# Patient Record
Sex: MALE | Race: WHITE | Employment: FULL TIME | URBAN - METROPOLITAN AREA
[De-identification: names, ages, dates, MRNs, and addresses within clinical notes are randomized per-mention and may not be internally consistent; named-entity substitution may affect disease eponyms.]

---

## 2021-11-09 ENCOUNTER — HOSPITAL ENCOUNTER (OUTPATIENT)
Dept: LAB | Age: 41
Discharge: HOME | End: 2021-11-09
Payer: OTHER GOVERNMENT

## 2021-11-09 ENCOUNTER — OFFICE VISIT (OUTPATIENT)
Dept: ONCOLOGY | Age: 41
End: 2021-11-09
Payer: OTHER GOVERNMENT

## 2021-11-09 VITALS
TEMPERATURE: 97.9 F | HEART RATE: 72 BPM | RESPIRATION RATE: 18 BRPM | SYSTOLIC BLOOD PRESSURE: 130 MMHG | DIASTOLIC BLOOD PRESSURE: 78 MMHG | HEIGHT: 71 IN | WEIGHT: 224 LBS | BODY MASS INDEX: 31.36 KG/M2 | OXYGEN SATURATION: 98 %

## 2021-11-09 DIAGNOSIS — D75.1 POLYCYTHEMIA: ICD-10-CM

## 2021-11-09 DIAGNOSIS — R79.89 ELEVATED FERRITIN: ICD-10-CM

## 2021-11-09 DIAGNOSIS — D69.6 THROMBOCYTOPENIA (HCC): Primary | ICD-10-CM

## 2021-11-09 PROBLEM — F32.A DEPRESSIVE DISORDER: Status: ACTIVE | Noted: 2019-11-12

## 2021-11-09 PROBLEM — R25.9 INVOLUNTARY MOVEMENTS: Status: ACTIVE | Noted: 2019-11-12

## 2021-11-09 PROBLEM — E83.119 HEMOCHROMATOSIS: Status: ACTIVE | Noted: 2019-11-12

## 2021-11-09 PROBLEM — R53.83 FATIGUE: Status: ACTIVE | Noted: 2019-11-12

## 2021-11-09 PROBLEM — R51.9 HEADACHE: Status: ACTIVE | Noted: 2020-02-06

## 2021-11-09 LAB
ALBUMIN SERPL-MCNC: 3.8 G/DL (ref 3.4–5)
ALBUMIN/GLOB SERPL: 1.3 {RATIO} (ref 0.8–1.7)
ALP SERPL-CCNC: 63 U/L (ref 45–117)
ALT SERPL-CCNC: 32 U/L (ref 16–61)
ANION GAP SERPL CALC-SCNC: 3 MMOL/L (ref 3–18)
AST SERPL-CCNC: 28 U/L (ref 10–38)
BASOPHILS # BLD: 0 K/UL (ref 0–0.1)
BASOPHILS NFR BLD: 0 % (ref 0–2)
BILIRUB SERPL-MCNC: 0.5 MG/DL (ref 0.2–1)
BUN SERPL-MCNC: 14 MG/DL (ref 7–18)
BUN/CREAT SERPL: 13 (ref 12–20)
CALCIUM SERPL-MCNC: 9.1 MG/DL (ref 8.5–10.1)
CHLORIDE SERPL-SCNC: 109 MMOL/L (ref 100–111)
CO2 SERPL-SCNC: 29 MMOL/L (ref 21–32)
CREAT SERPL-MCNC: 1.07 MG/DL (ref 0.6–1.3)
DIFFERENTIAL METHOD BLD: ABNORMAL
EOSINOPHIL # BLD: 0.2 K/UL (ref 0–0.4)
EOSINOPHIL NFR BLD: 2 % (ref 0–5)
ERYTHROCYTE [DISTWIDTH] IN BLOOD BY AUTOMATED COUNT: 12.3 % (ref 11.6–14.5)
FERRITIN SERPL-MCNC: 186 NG/ML (ref 8–388)
GLOBULIN SER CALC-MCNC: 3 G/DL (ref 2–4)
GLUCOSE SERPL-MCNC: 91 MG/DL (ref 74–99)
HCT VFR BLD AUTO: 47.3 % (ref 36–48)
HGB BLD-MCNC: 16.6 G/DL (ref 13–16)
IRON SATN MFR SERPL: 35 % (ref 20–50)
IRON SERPL-MCNC: 100 UG/DL (ref 50–175)
LYMPHOCYTES # BLD: 3 K/UL (ref 0.9–3.6)
LYMPHOCYTES NFR BLD: 39 % (ref 21–52)
MCH RBC QN AUTO: 30.7 PG (ref 24–34)
MCHC RBC AUTO-ENTMCNC: 35.1 G/DL (ref 31–37)
MCV RBC AUTO: 87.6 FL (ref 78–100)
MONOCYTES # BLD: 0.6 K/UL (ref 0.05–1.2)
MONOCYTES NFR BLD: 7 % (ref 3–10)
NEUTS SEG # BLD: 4 K/UL (ref 1.8–8)
NEUTS SEG NFR BLD: 51 % (ref 40–73)
PLATELET # BLD AUTO: 174 K/UL (ref 135–420)
PMV BLD AUTO: 11.3 FL (ref 9.2–11.8)
POTASSIUM SERPL-SCNC: 4.2 MMOL/L (ref 3.5–5.5)
PROT SERPL-MCNC: 6.8 G/DL (ref 6.4–8.2)
RBC # BLD AUTO: 5.4 M/UL (ref 4.35–5.65)
SODIUM SERPL-SCNC: 141 MMOL/L (ref 136–145)
TIBC SERPL-MCNC: 282 UG/DL (ref 250–450)
WBC # BLD AUTO: 7.8 K/UL (ref 4.6–13.2)

## 2021-11-09 PROCEDURE — 36415 COLL VENOUS BLD VENIPUNCTURE: CPT

## 2021-11-09 PROCEDURE — 82728 ASSAY OF FERRITIN: CPT

## 2021-11-09 PROCEDURE — 85025 COMPLETE CBC W/AUTO DIFF WBC: CPT

## 2021-11-09 PROCEDURE — 80053 COMPREHEN METABOLIC PANEL: CPT

## 2021-11-09 PROCEDURE — 99203 OFFICE O/P NEW LOW 30 MIN: CPT | Performed by: INTERNAL MEDICINE

## 2021-11-09 PROCEDURE — 83540 ASSAY OF IRON: CPT

## 2021-11-09 RX ORDER — HYDROCODONE BITARTRATE AND ACETAMINOPHEN 7.5; 325 MG/1; MG/1
TABLET ORAL
COMMUNITY

## 2021-11-09 RX ORDER — CHOLECALCIFEROL (VITAMIN D3) 25 MCG
TABLET,CHEWABLE ORAL
COMMUNITY

## 2021-11-09 RX ORDER — IBUPROFEN 800 MG/1
TABLET ORAL
COMMUNITY

## 2021-11-09 RX ORDER — CHOLECALCIFEROL (VITAMIN D3) 50 MCG
CAPSULE ORAL
COMMUNITY

## 2021-11-09 NOTE — PROGRESS NOTES
Hematology/Oncology Consultation Note      Date: 2021    Name: Sravanthi Floyd  : 1980        None         Subjective:     Chief complaint: Thrombocytopenia, Polycythemia, and elevated ferritin    History of Present Illness:   Mr. David Botello is a most pleasant 37y.o. year old male who was seen for consultation of Thrombocytopenia, Polycythemia, and elevated ferritin. The patient has a past medical history significant of Thrombocytopenia, Polycythemia, and elevated ferritin. He was seen by Dr. Kristen Eugene hematology/oncology [place], [place]) on 2018 in [place]. 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 Per chart review from his previous hematologist, 2018 CBC reviewed low platelet count of 914,913. His platelet was low since at least 2003, 138,000 per chart review. Previous work-up revealed hepatitis panel/HIV/antiplatelet QGDMGETS/J96/NHVEOC/LBEVPZM electrophoresis unremarkable, nrmal platelet clumping. He was suggested to continue follow-up with CBC every 6 months. He has also history of elevated ferritin level, homozygous H63D mutation positive, low likelihood of clinical hemochromatosis. He was placed on therapeutic phlebotomy every 3 to 4 months due to patient continuing systemic complaints with goal of maintaining ferritin in 100 -150 or less range. He has seen GI who has performed liver biopsy in 12/3/2019, hepatic iron index reported 1.1, mild iron accumulation, no fibrosis or inflammation reportedly. He has history of polycythemia likely secondary with normal EPO level, negative JAK2/CALR/MPL/JAK2 V617K mutations, possible underlying sleep apnea. The patient reported doing well overall. His last phlebotmy was about in May 2021  The patient otherwise has no other complaints. Denied fever, chills, night sweat, unintentional weight loss, skin lumps or bumps, acute bleeding or bruising issues.  Denied headache, acute vision change, dizziness, chest pain, worsen shortness of breath, palpitation, productive cough, nausea, vomiting, abdominal pain, altered bowel habits, dysuria, worsen bone pain or back pain, new focal numbness or weakness. Past Medical History, Family History, and Social History:    Past Medical History:   Diagnosis Date    Thrombocytopenia ([address]      Past Surgical History:   Procedure Laterality Date    HX ORTHOPAEDIC      left shoulder 2004/2005     Social History     Socioeconomic History    Marital status:      Spouse name: Not on file    Number of children: Not on file    Years of education: Not on file    Highest education level: Not on file   Occupational History    Not on file   Tobacco Use    Smoking status: Never Smoker    Smokeless tobacco: Never Used   Vaping Use    Vaping Use: Never used   Substance and Sexual Activity    Alcohol use: Yes    Drug use: Never    Sexual activity: Not on file   Other Topics Concern    Not on file   Social History Narrative    Not on file     Social Determinants of Health     Financial Resource Strain:     Difficulty of Paying Living Expenses: Not on file   Food Insecurity:     Worried About Running Out of Food in the Last Year: Not on file    Bibiana of Food in the Last Year: Not on file   Transportation Needs:     Lack of Transportation (Medical): Not on file    Lack of Transportation (Non-Medical):  Not on file   Physical Activity:     Days of Exercise per Week: Not on file    Minutes of Exercise per Session: Not on file   Stress:     Feeling of Stress : Not on file   Social Connections:     Frequency of Communication with Friends and Family: Not on file    Frequency of Social Gatherings with Friends and Family: Not on file    Attends Cheondoism Services: Not on file    Active Member of Clubs or Organizations: Not on file    Attends Club or Organization Meetings: Not on file    Marital Status: Not on file Intimate Partner Violence:     Fear of Current or Ex-Partner: Not on file    Emotionally Abused: Not on file    Physically Abused: Not on file    Sexually Abused: Not on file   Housing Stability:     Unable to Pay for Housing in the Last Year: Not on file    Number of [place] in the Last Year: Not on file    Unstable Housing in the Last Year: Not on file     History reviewed. No pertinent family history. Review of Systems   Constitutional: Negative for chills, diaphoresis, fever, malaise/fatigue and weight loss. Respiratory: Negative for cough, hemoptysis, shortness of breath and wheezing. Cardiovascular: Negative for chest pain, palpitations and leg swelling. Gastrointestinal: Negative for abdominal pain, diarrhea, heartburn, nausea and vomiting. Genitourinary: Negative for dysuria, frequency, hematuria and urgency. Musculoskeletal: Negative for joint pain and myalgias. Skin: Negative for itching and rash. Neurological: Negative for dizziness, seizures, weakness and headaches. Psychiatric/Behavioral: Negative for depression. The patient does not have insomnia. Objective:     Visit Vitals  /78   Pulse 72   Temp 97.9 °F (36.6 °C) (Temporal)   Resp 18   Ht 5' 11\" (1.803 m)   Wt 101.6 kg (224 lb)   SpO2 98%   BMI 31.24 kg/m²       ECOG Performance Status (grade): 0  0 - able to carry on all pre-disease activity w/out restriction  1 - restricted but able to carry out light work  2 - ambulatory and can self- care but unable to carry out work  3 - bed or chair >50% of waking hours  4 - completely disable, total care, confined to bed or chair    Physical Exam  Constitutional:       General: He is not in acute distress. HENT:      Head: Normocephalic and atraumatic. Eyes:      Pupils: Pupils are equal, round, and reactive to light. Cardiovascular:      Pulses: Normal pulses. Heart sounds: Normal heart sounds. No murmur heard.       Pulmonary:      Effort: Pulmonary effort is normal. No respiratory distress. Breath sounds: Normal breath sounds. Abdominal:      General: Bowel sounds are normal. There is no distension. Palpations: Abdomen is soft. There is no mass. Tenderness: There is no abdominal tenderness. There is no guarding. Musculoskeletal:         General: No swelling or tenderness. Cervical back: Neck supple. No rigidity. Lymphadenopathy:      Cervical: No cervical adenopathy. Skin:     General: Skin is warm. Findings: No rash. Neurological:      Mental Status: He is alert and oriented to person, place, and time. Mental status is at baseline. Cranial Nerves: No cranial nerve deficit. Psychiatric:         Mood and Affect: Mood normal.          Diagnostics:      No results found for this or any previous visit (from the past 96 hour(s)). Imaging:  No results found for this or any previous visit. No results found for this or any previous visit. No results found for this or any previous visit. Pathology          Assessment:                                        1. Thrombocytopenia ([address]    2. Polycythemia    3. Elevated ferritin        Plan:                                        # Thrombocytopenia  # Polycythemia  # Elevated ferritin. -- The patient has a past medical history significant of Thrombocytopenia, Polycythemia, and elevated ferritin. -- He was seen by Dr. Elva Zuñiga hematology/oncology [facility] on 8/20/2018 in [place]. 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 Per chart review from his previous hematologist, 7/2018 CBC reviewed low platelet count of 252,116. His platelet was low since at least 09/2003, 138,000 per chart review. Previous work-up revealed hepatitis panel/HIV/antiplatelet YUSHGIBN/Z70/ZOHQQH/BTRSXWX electrophoresis unremarkable, nrmal platelet clumping.   He was suggested to continue follow-up with CBC every 6 months. -- He has also history of elevated ferritin level, homozygous H63D mutation positive, low likelihood of clinical hemochromatosis. He was placed on therapeutic phlebotomy every 3 to 4 months due to patient continuing systemic complaints with goal of maintaining ferritin in 100 -150 or less range. He has seen GI who has performed liver biopsy in 12/3/2019, hepatic iron index reported 1.1, mild iron accumulation, no fibrosis or inflammation reportedly. -- He has history of polycythemia likely secondary with normal EPO level, negative JAK2/CALR/MPL/JAK2 V617K mutations, possible underlying sleep apnea. -- The patient reported doing well overall. His last phlebotmy was about in May 2021    Plan:  -- Lab check CBC, CMP, Iron profile, ferritin  -- He will probably need to resume therapeutic phlebotomy. -- Further workup will be guided by results from aforementioned initial study. -- We will see the patient back in about 2-3 weeks. Always sooner if required. Orders Placed This Encounter    METABOLIC PANEL, COMPREHENSIVE     Standing Status:   Future     Standing Expiration Date:   11/10/2022    IRON PROFILE     Standing Status:   Future     Standing Expiration Date:   11/10/2022    FERRITIN     Standing Status:   Future     Standing Expiration Date:   11/9/2022    CBC WITH AUTOMATED DIFF     Standing Status:   Future     Standing Expiration Date:   11/10/2022    omega 3-dha-epa-fish oil (Fish OiL) 100-160-1,000 mg cap     Sig: Fish Oil   Take 1 BID    ibuprofen (MOTRIN) 800 mg tablet     Sig: Motrin 800 mg tablet   Take 1 tablet 3 times a day by oral route.  HYDROcodone-acetaminophen (NORCO) 7.5-325 mg per tablet     Sig: hydrocodone 7.5 mg-acetaminophen 325 mg tablet   TK ONE T PO Q 4 HOURS PRN P    fexofenadine HCl (ALLEGRA PO)     Sig: Take  by mouth.     cyanocobalamin, vitamin B-12, 1,000 mcg cap     Sig: cyanocobalamin (vitamin B-12) 1,000 mcg capsule   Take 1 capsule every day by oral route. Mr. Bora Cedillo has a reminder for a \"due or due soon\" health maintenance. I have asked that he contact his primary care provider for follow-up on this health maintenance. All of patient's questions answered to their apparent satisfaction. They verbally show understanding and agreement with aforementioned plan. Angel Witt MD  11/9/2021        Above mentioned total time spent for this encounter with more than 50% of the time spent in face-to-face counseling, discussing on diagnosis and management plan going forward, and co-ordination of care. Parts of this document has been produced using Dragon dictation system. Unrecognized errors in transcription may be present. Please do not hesitate to reach out for any questions or clarifications.

## 2021-12-01 ENCOUNTER — VIRTUAL VISIT (OUTPATIENT)
Dept: ONCOLOGY | Age: 41
End: 2021-12-01
Payer: OTHER GOVERNMENT

## 2021-12-01 DIAGNOSIS — R79.89 ELEVATED FERRITIN: ICD-10-CM

## 2021-12-01 DIAGNOSIS — D75.1 POLYCYTHEMIA: Primary | ICD-10-CM

## 2021-12-01 PROCEDURE — 99442 PR PHYS/QHP TELEPHONE EVALUATION 11-20 MIN: CPT | Performed by: INTERNAL MEDICINE

## 2021-12-01 NOTE — PROGRESS NOTES
Ishaan Gamino is a 37 y.o. male, evaluated via audio-only technology on 12/1/2021 for No chief complaint on file. .    Assessment & Plan:   Diagnoses and all orders for this visit:    1. Polycythemia    2. Elevated ferritin      # Polycythemia  # Elevated ferritin; homozygous H63D mutation positive. -- The patient has a past medical history significant of Thrombocytopenia, Polycythemia, and elevated ferritin. -- He was seen by Dr. Aarti Hardwick hematology/oncology [facility] on 8/20/2018 in [place]. 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 Per chart review from his previous hematologist, 7/2018 CBC reviewed low platelet count of 667,725. His platelet was low since at least 09/2003, 138,000 per chart review. Previous work-up revealed hepatitis panel/HIV/antiplatelet WXFPSBDO/K12/QDUEFD/AHCCBYN electrophoresis unremarkable, nrmal platelet clumping. He was suggested to continue follow-up with CBC every 6 months. -- He has also history of elevated ferritin level, homozygous H63D mutation positive, low likelihood of clinical hemochromatosis. He was placed on therapeutic phlebotomy every 3 to 4 months due to patient continuing systemic complaints with goal of maintaining ferritin in range of 100 -150 or less . -- He has seen GI who has performed liver biopsy in 12/3/2019, hepatic iron index reported 1.1, mild iron accumulation, no fibrosis or inflammation reportedly. -- He has history of polycythemia likely secondary process with normal EPO level, negative JAK2/CALR/MPL/JAK2 V617K mutations, possible underlying sleep apnea. -- The patient reported doing well overall. His last phlebotmy was about in May 2021.  -- Today I have reviewed with the patient about recent lab reports. 11/9/2021 CBC reported hemoglobin 16.6, hematocrit 47.3%, WBC 7.8, platelet 868,956. Iron profile showed saturation 35%, ferritin 186.       Plan:  -- The patient was agreeable to resume therapeutic phlebotomy, every 3 months  -- Continue Lab check CBC, CMP, Iron profile, ferritin  -- He will f/u PCP for [facility] evaluation  -- We will see the patient back in about 6 months. Always sooner if required. Hx Thrombocytopenia  -- 11/9/2021 CBC reported hemoglobin 16.6, hematocrit 47.3%, WBC 7.8, platelet 574,442.    12  Subjective:   Mr. Yonathan Vazquez is a most pleasant 37y.o. year old male who was seen for consultation of Thrombocytopenia, Polycythemia, and elevated ferritin. The patient has a past medical history significant of Thrombocytopenia, Polycythemia, and elevated ferritin. He was seen by Dr. Angelita Goldberg hematology/oncology [facility] on 8/20/2018 in [place]. 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 Per chart review from his previous hematologist, 7/2018 CBC reviewed low platelet count of 008,325. His platelet was low since at least 09/2003, 138,000 per chart review. Previous work-up revealed hepatitis panel/HIV/antiplatelet DZBUXOYC/E22/JIQIBN/TBZMDVZ electrophoresis unremarkable, nrmal platelet clumping. He was suggested to continue follow-up with CBC every 6 months. He has also history of elevated ferritin level, homozygous H63D mutation positive, low likelihood of clinical hemochromatosis. He was placed on therapeutic phlebotomy every 3 to 4 months due to patient continuing systemic complaints with goal of maintaining ferritin in 100 -150 or less range. He has seen GI who has performed liver biopsy in 12/3/2019, hepatic iron index reported 1.1, mild iron accumulation, no fibrosis or inflammation reportedly. He has history of polycythemia likely secondary process with normal EPO level, negative JAK2/CALR/MPL/JAK2 V617K mutations, possible underlying sleep apnea. His last phlebotmy was about in May 2021  Since last visit the patient reported doing well overall.  The patient otherwise has no other complaints. Denied fever, chills, night sweat, unintentional weight loss, skin lumps or bumps, acute bleeding or bruising issues. Denied headache, acute vision change, dizziness, chest pain, worsen shortness of breath, palpitation, productive cough, nausea, vomiting, abdominal pain, altered bowel habits, dysuria, worsen bone pain or back pain, new focal numbness or weakness. Prior to Admission medications    Medication Sig Start Date End Date Taking? Authorizing Provider   omega 3-dha-epa-fish oil (Fish OiL) 100-160-1,000 mg cap Fish Oil   Take 1 BID    Provider, Historical   ibuprofen (MOTRIN) 800 mg tablet Motrin 800 mg tablet   Take 1 tablet 3 times a day by oral route. Provider, Historical   HYDROcodone-acetaminophen (NORCO) 7.5-325 mg per tablet hydrocodone 7.5 mg-acetaminophen 325 mg tablet   TK ONE T PO Q 4 HOURS PRN P  Patient not taking: Reported on 11/9/2021    Provider, Historical   fexofenadine HCl (ALLEGRA PO) Take  by mouth. Provider, Historical   cyanocobalamin, vitamin B-12, 1,000 mcg cap cyanocobalamin (vitamin B-12) 1,000 mcg capsule   Take 1 capsule every day by oral route. Provider, Historical     Patient Active Problem List   Diagnosis Code    Depressive disorder F32.9    Fatigue R53.83    Headache R51.9    Hemochromatosis E83.119    High serum ferritin R79.89    Involuntary movements R25.9       Review of Systems   Constitutional: Negative for chills, diaphoresis, fever, malaise/fatigue and weight loss. Respiratory: Negative for cough, hemoptysis, shortness of breath and wheezing. Cardiovascular: Negative for chest pain, palpitations and leg swelling. Gastrointestinal: Negative for abdominal pain, diarrhea, heartburn, nausea and vomiting. Genitourinary: Negative for dysuria, frequency, hematuria and urgency. Musculoskeletal: Negative for joint pain and myalgias. Skin: Negative for itching and rash.    Neurological: Negative for dizziness, seizures, weakness and headaches. Psychiatric/Behavioral: Negative for depression. The patient does not have insomnia. No flowsheet data found. Iman Copeland, who was evaluated through a patient-initiated, synchronous (real-time) audio only encounter, and/or her healthcare decision maker, is aware that it is a billable service, with coverage as determined by his insurance carrier. He provided verbal consent to proceed: Yes. He has not had a related appointment within my department in the past 7 days or scheduled within the next 24 hours. On this date 12/01/2021 I have spent 20 minutes reviewing previous notes, test results and face to face (virtual) with the patient discussing the diagnosis and importance of compliance with the treatment plan as well as documenting on the day of the visit.     Angela Harley MD

## 2022-01-05 ENCOUNTER — HOSPITAL ENCOUNTER (OUTPATIENT)
Dept: INFUSION THERAPY | Age: 42
End: 2022-01-05

## 2022-01-07 ENCOUNTER — HOSPITAL ENCOUNTER (OUTPATIENT)
Dept: INFUSION THERAPY | Age: 42
Discharge: HOME | End: 2022-01-07
Payer: OTHER GOVERNMENT

## 2022-01-07 VITALS
RESPIRATION RATE: 18 BRPM | SYSTOLIC BLOOD PRESSURE: 122 MMHG | HEART RATE: 67 BPM | DIASTOLIC BLOOD PRESSURE: 79 MMHG | OXYGEN SATURATION: 95 % | TEMPERATURE: 98 F

## 2022-01-07 LAB
BASO+EOS+MONOS # BLD AUTO: 0.5 K/UL (ref 0–2.3)
BASO+EOS+MONOS NFR BLD AUTO: 5 % (ref 0.1–17)
DIFFERENTIAL METHOD BLD: ABNORMAL
ERYTHROCYTE [DISTWIDTH] IN BLOOD BY AUTOMATED COUNT: 12.8 % (ref 11.5–14.5)
HCT VFR BLD AUTO: 48.3 % (ref 36–48)
HGB BLD-MCNC: 17.2 G/DL (ref 12–16)
LYMPHOCYTES # BLD: 3.3 K/UL (ref 1.1–5.9)
LYMPHOCYTES NFR BLD: 38 % (ref 14–44)
MCH RBC QN AUTO: 31.8 PG (ref 25–35)
MCHC RBC AUTO-ENTMCNC: 35.6 G/DL (ref 31–37)
MCV RBC AUTO: 89.3 FL (ref 78–102)
NEUTS SEG # BLD: 4.9 K/UL (ref 1.8–9.5)
NEUTS SEG NFR BLD: 57 % (ref 40–70)
PLATELET # BLD AUTO: 181 K/UL (ref 140–440)
RBC # BLD AUTO: 5.41 M/UL (ref 4.1–5.1)
WBC # BLD AUTO: 8.7 K/UL (ref 4.5–13)

## 2022-01-07 PROCEDURE — 99195 PHLEBOTOMY: CPT

## 2022-01-07 PROCEDURE — 85025 COMPLETE CBC W/AUTO DIFF WBC: CPT

## 2022-01-07 PROCEDURE — 36415 COLL VENOUS BLD VENIPUNCTURE: CPT

## 2022-01-07 PROCEDURE — 74011250636 HC RX REV CODE- 250/636: Performed by: INTERNAL MEDICINE

## 2022-01-07 RX ORDER — SODIUM CHLORIDE 9 MG/ML
999 INJECTION, SOLUTION INTRAVENOUS CONTINUOUS
Status: DISCONTINUED | OUTPATIENT
Start: 2022-01-07 | End: 2022-01-08 | Stop reason: HOSPADM

## 2022-01-07 RX ADMIN — SODIUM CHLORIDE 999 ML/HR: 9 INJECTION, SOLUTION INTRAVENOUS at 15:15

## 2022-01-07 NOTE — PROGRESS NOTES
[facility] Progress Note                   Date: 2022    Name: Gonzalo Reynoso    MRN: [id number]    : 1980    Therapeutic Phlebotomy (Weekly Status)    Mr. Reji Mcnally to [facility] ambulatory at [phone] accompanied by self. Pt was accessed and education was provided. Pt states he just moved here from [place] and has been getting therapeutic phlebotomies since 2019 and has tolerated well. Pt politely declined education packet as he has already been getting them and is well educated on procedure. Mr. Shellie Moody vitals were reviewed and patient was observed for 5 minutes prior to treatment. Visit Vitals  /79 (BP 1 Location: Left upper arm, BP Patient Position: Sitting)   Pulse 67   Temp 98 °F (36.7 °C)   Resp 18   SpO2 95%     CBC drawn from [facility] by Monique Phlebotomist.     Recent Results (from the past 24 hour(s))   CBC WITH 3 PART DIFF    Collection Time: 22  2:20 PM   Result Value Ref Range    WBC 8.7 4.5 - 13.0 K/uL    RBC 5.41 (H) 4.10 - 5.10 M/uL    HGB 17.2 (HH) 12.0 - 16 g/dL    HCT 48.3 (H) 36 - 48 %    MCV 89.3 78 - 102 FL    MCH 31.8 25.0 - 35.0 PG    MCHC 35.6 31 - 37 g/dL    RDW 12.8 11.5 - 14.5 %    PLATELET 282 672 - 819 K/uL    NEUTROPHILS 57 40 - 70 %    MIXED CELLS 5 0.1 - 17 %    LYMPHOCYTES 38 14 - 44 %    ABS. NEUTROPHILS 4.9 1.8 - 9.5 K/UL    ABS. MIXED CELLS 0.5 0.0 - 2.3 K/uL    ABS. LYMPHOCYTES 3.3 1.1 - 5.9 K/UL    DF AUTOMATED           20GA IV inserted in patient's Right antecubital  x1 attempt. Phlebotomy started at 1458. Phlebotomy ended at 1515. NS 500cc bolus started at end of phlebotomy. Pt remained stable throughout procedure and voices no complaints. Pt tolerated well with no complaints or concerns. Patients armband removed and shredded. Mr. Reji Mcnally was discharged from [place] [address] in stable condition at 1540.  He is to return on 22 at 1400 for his next phlebotomy (weekly status) appointment.     Nereyda Cervantes RN  January 7, 2022  4:09 PM

## 2022-01-07 NOTE — PROGRESS NOTES
[facility] Progress Note                   Date: 2022    Name: Jannette Farrar    MRN: [id number]    : 1980    Therapeutic Phlebotomy (Weekly Status)    Mr. Randolph Werner to [facility] ambulatory at [phone] accompanied by self. Pt was accessed and education was provided. Pt states he just moved here from [place] and has been getting therapeutic phlebotomies since 2019 and has tolerated well. Mr. Henry Iglesias vitals were reviewed and patient was observed for 5 minutes prior to treatment. Visit Vitals  /79 (BP 1 Location: Left upper arm, BP Patient Position: Sitting)   Pulse 67   Temp 98 °F (36.7 °C)   Resp 18   SpO2 95%     CBC drawn from [facility] by Monique Phlebotomist.     Recent Results (from the past 24 hour(s))   CBC WITH 3 PART DIFF    Collection Time: 22  2:20 PM   Result Value Ref Range    WBC 8.7 4.5 - 13.0 K/uL    RBC 5.41 (H) 4.10 - 5.10 M/uL    HGB 17.2 (HH) 12.0 - 16 g/dL    HCT 48.3 (H) 36 - 48 %    MCV 89.3 78 - 102 FL    MCH 31.8 25.0 - 35.0 PG    MCHC 35.6 31 - 37 g/dL    RDW 12.8 11.5 - 14.5 %    PLATELET 297 746 - 038 K/uL    NEUTROPHILS 57 40 - 70 %    MIXED CELLS 5 0.1 - 17 %    LYMPHOCYTES 38 14 - 44 %    ABS. NEUTROPHILS 4.9 1.8 - 9.5 K/UL    ABS. MIXED CELLS 0.5 0.0 - 2.3 K/uL    ABS. LYMPHOCYTES 3.3 1.1 - 5.9 K/UL    DF AUTOMATED           20GA IV inserted in patient's Right antecubital  x1 attempt. Phlebotomy started at 1458. Phlebotomy ended at 1515. NS 500cc bolus started at this time. Pt remained stable throughout procedure and voices no complaints. Pt tolerated well with no complaints or concerns. Patients armband removed and shredded. Mr. Randolph Werner was discharged from [place] [address] in stable condition at 1540. He is to return on 22 at 1400 for his next phlebotomy (weekly status) appointment.     Yas Sahni RN  2022  4:09 PM

## 2022-01-14 ENCOUNTER — APPOINTMENT (OUTPATIENT)
Dept: INFUSION THERAPY | Age: 42
End: 2022-01-14
Payer: OTHER GOVERNMENT

## 2022-01-14 NOTE — ADDENDUM NOTE
Encounter addended by: Lizet Merchant RN on: 1/14/2022 5:29 PM   Actions taken: Charge Capture section accepted

## 2022-01-18 ENCOUNTER — HOSPITAL ENCOUNTER (OUTPATIENT)
Dept: INFUSION THERAPY | Age: 42
Discharge: HOME | End: 2022-01-18
Payer: OTHER GOVERNMENT

## 2022-01-18 VITALS
DIASTOLIC BLOOD PRESSURE: 73 MMHG | OXYGEN SATURATION: 98 % | TEMPERATURE: 97.9 F | HEART RATE: 76 BPM | SYSTOLIC BLOOD PRESSURE: 145 MMHG | RESPIRATION RATE: 18 BRPM

## 2022-01-18 LAB
BASO+EOS+MONOS # BLD AUTO: 0.4 K/UL (ref 0–2.3)
BASO+EOS+MONOS NFR BLD AUTO: 6 % (ref 0.1–17)
DIFFERENTIAL METHOD BLD: NORMAL
ERYTHROCYTE [DISTWIDTH] IN BLOOD BY AUTOMATED COUNT: 13.1 % (ref 11.5–14.5)
HCT VFR BLD AUTO: 44.8 % (ref 36–48)
HGB BLD-MCNC: 15.7 G/DL (ref 12–16)
LYMPHOCYTES # BLD: 2.8 K/UL (ref 1.1–5.9)
LYMPHOCYTES NFR BLD: 37 % (ref 14–44)
MCH RBC QN AUTO: 31.2 PG (ref 25–35)
MCHC RBC AUTO-ENTMCNC: 35 G/DL (ref 31–37)
MCV RBC AUTO: 88.9 FL (ref 78–102)
NEUTS SEG # BLD: 4.3 K/UL (ref 1.8–9.5)
NEUTS SEG NFR BLD: 57 % (ref 40–70)
PLATELET # BLD AUTO: 156 K/UL (ref 140–440)
RBC # BLD AUTO: 5.04 M/UL (ref 4.1–5.1)
WBC # BLD AUTO: 7.5 K/UL (ref 4.5–13)

## 2022-01-18 PROCEDURE — 99195 PHLEBOTOMY: CPT

## 2022-01-18 PROCEDURE — 85025 COMPLETE CBC W/AUTO DIFF WBC: CPT

## 2022-01-18 RX ORDER — MELOXICAM 15 MG/1
15 TABLET ORAL DAILY
COMMUNITY

## 2022-01-25 ENCOUNTER — HOSPITAL ENCOUNTER (OUTPATIENT)
Dept: INFUSION THERAPY | Age: 42
Discharge: HOME | End: 2022-01-25
Payer: OTHER GOVERNMENT

## 2022-01-25 VITALS
OXYGEN SATURATION: 100 % | SYSTOLIC BLOOD PRESSURE: 123 MMHG | TEMPERATURE: 97.7 F | RESPIRATION RATE: 16 BRPM | HEART RATE: 69 BPM | DIASTOLIC BLOOD PRESSURE: 81 MMHG

## 2022-01-25 LAB
BASO+EOS+MONOS # BLD AUTO: 0.3 K/UL (ref 0–2.3)
BASO+EOS+MONOS NFR BLD AUTO: 4 % (ref 0.1–17)
DIFFERENTIAL METHOD BLD: NORMAL
ERYTHROCYTE [DISTWIDTH] IN BLOOD BY AUTOMATED COUNT: 13.1 % (ref 11.5–14.5)
HCT VFR BLD AUTO: 44.3 % (ref 36–48)
HGB BLD-MCNC: 15.2 G/DL (ref 12–16)
LYMPHOCYTES # BLD: 2.5 K/UL (ref 1.1–5.9)
LYMPHOCYTES NFR BLD: 38 % (ref 14–44)
MCH RBC QN AUTO: 30.8 PG (ref 25–35)
MCHC RBC AUTO-ENTMCNC: 34.3 G/DL (ref 31–37)
MCV RBC AUTO: 89.9 FL (ref 78–102)
NEUTS SEG # BLD: 3.8 K/UL (ref 1.8–9.5)
NEUTS SEG NFR BLD: 58 % (ref 40–70)
PLATELET # BLD AUTO: 158 K/UL (ref 140–440)
RBC # BLD AUTO: 4.93 M/UL (ref 4.1–5.1)
WBC # BLD AUTO: 6.6 K/UL (ref 4.5–13)

## 2022-01-25 PROCEDURE — 36415 COLL VENOUS BLD VENIPUNCTURE: CPT

## 2022-01-25 PROCEDURE — 99195 PHLEBOTOMY: CPT

## 2022-01-25 PROCEDURE — 74011250636 HC RX REV CODE- 250/636: Performed by: INTERNAL MEDICINE

## 2022-01-25 PROCEDURE — 85025 COMPLETE CBC W/AUTO DIFF WBC: CPT

## 2022-01-25 RX ORDER — SODIUM CHLORIDE 9 MG/ML
500 INJECTION, SOLUTION INTRAVENOUS CONTINUOUS
Status: DISCONTINUED | OUTPATIENT
Start: 2022-01-25 | End: 2022-01-26 | Stop reason: HOSPADM

## 2022-01-25 RX ADMIN — SODIUM CHLORIDE 500 ML: 9 INJECTION, SOLUTION INTRAVENOUS at 14:55

## 2022-01-25 NOTE — PROGRESS NOTES
[facility] Progress Note                   Date: 2022    Name: Glenys Sims    MRN: [id number]    : 1980    Therapeutic Phlebotomy (Weekly Status)    Mr. Elizabeth Roe to [address] [place] [address] ambulatory at [phone]. Pt was accessed and education, including discharge instructions, was provided. Mr. Monroe Poole vitals were reviewed and patient was observed for 5 minutes prior to treatment. Visit Vitals  /79 (BP 1 Location: Right upper arm, BP Patient Position: Sitting)   Pulse (!) 57   Temp 97.5 °F (36.4 °C)   Resp 16   SpO2 100%     CBC drawn from PIV site started with 20G x1 attempt. Recent Results (from the past 24 hour(s))   CBC WITH 3 PART DIFF    Collection Time: 22  2:20 PM   Result Value Ref Range    WBC 6.6 4.5 - 13.0 K/uL    RBC 4.93 4.10 - 5.10 M/uL    HGB 15.2 12.0 - 16 g/dL    HCT 44.3 36 - 48 %    MCV 89.9 78 - 102 FL    MCH 30.8 25.0 - 35.0 PG    MCHC 34.3 31 - 37 g/dL    RDW 13.1 11.5 - 14.5 %    PLATELET 973 245 - 294 K/uL    NEUTROPHILS 58 40 - 70 %    MIXED CELLS 4 0.1 - 17 %    LYMPHOCYTES 38 14 - 44 %    ABS. NEUTROPHILS 3.8 1.8 - 9.5 K/UL    ABS. MIXED CELLS 0.3 0.0 - 2.3 K/uL    ABS. LYMPHOCYTES 2.5 1.1 - 5.9 K/UL    DF AUTOMATED       HCT = 44.3    Phlebotomy started at 1431 and stopped at 1455. Approx 500 ml blood removed from patient and 500 ml NS IV given. Pt tolerated well with no complaints or concerns. Patients armband removed and shredded. Mr. Elizabeth Roe was discharged from [place] [address] in stable condition at [phone]. He is to return in one weekfor his next phlebotomy (weekly status) appointment.       Marlow Meckel, RN,RN  2022

## 2022-02-01 ENCOUNTER — APPOINTMENT (OUTPATIENT)
Dept: INFUSION THERAPY | Age: 42
End: 2022-02-01
Payer: OTHER GOVERNMENT

## 2022-02-16 ENCOUNTER — HOSPITAL ENCOUNTER (OUTPATIENT)
Dept: INFUSION THERAPY | Age: 42
Discharge: HOME | End: 2022-02-16
Payer: OTHER GOVERNMENT

## 2022-02-16 VITALS
HEART RATE: 75 BPM | DIASTOLIC BLOOD PRESSURE: 75 MMHG | RESPIRATION RATE: 16 BRPM | TEMPERATURE: 98.3 F | SYSTOLIC BLOOD PRESSURE: 132 MMHG | OXYGEN SATURATION: 98 %

## 2022-02-16 LAB
BASO+EOS+MONOS # BLD AUTO: 0.4 K/UL (ref 0–2.3)
BASO+EOS+MONOS NFR BLD AUTO: 8 % (ref 0.1–17)
DIFFERENTIAL METHOD BLD: ABNORMAL
ERYTHROCYTE [DISTWIDTH] IN BLOOD BY AUTOMATED COUNT: 13.2 % (ref 11.5–14.5)
HCT VFR BLD AUTO: 44.8 % (ref 36–48)
HGB BLD-MCNC: 15 G/DL (ref 12–16)
LYMPHOCYTES # BLD: 1.9 K/UL (ref 1.1–5.9)
LYMPHOCYTES NFR BLD: 40 % (ref 14–44)
MCH RBC QN AUTO: 30.7 PG (ref 25–35)
MCHC RBC AUTO-ENTMCNC: 33.5 G/DL (ref 31–37)
MCV RBC AUTO: 91.6 FL (ref 78–102)
NEUTS SEG # BLD: 2.5 K/UL (ref 1.8–9.5)
NEUTS SEG NFR BLD: 53 % (ref 40–70)
PLATELET # BLD AUTO: 132 K/UL (ref 140–440)
RBC # BLD AUTO: 4.89 M/UL (ref 4.1–5.1)
WBC # BLD AUTO: 4.8 K/UL (ref 4.5–13)

## 2022-02-16 PROCEDURE — 85025 COMPLETE CBC W/AUTO DIFF WBC: CPT

## 2022-02-16 PROCEDURE — 36415 COLL VENOUS BLD VENIPUNCTURE: CPT

## 2022-02-16 PROCEDURE — 99195 PHLEBOTOMY: CPT

## 2022-02-16 NOTE — PROGRESS NOTES
[facility] Progress Note                   Date: 2022    Name: Zena Jama    MRN: [id number]    : 1980    Therapeutic Phlebotomy (Weekly Status)    Mr. Migdalia Roper to [facility] ambulatory at 1300 accompanied by self. Pt was accessed and education was provided. Mr. Robert Tolentino vitals were reviewed. Visit Vitals  /71 (BP 1 Location: Left upper arm, BP Patient Position: Sitting)   Pulse 72   Temp 98 °F (36.7 °C)   Resp 16   SpO2 98%     CBC drawn from [facility] by Monique Phlebotomist. Labs indicate phlebotomy needed for today. Recent Results (from the past 24 hour(s))   CBC WITH 3 PART DIFF    Collection Time: 22  1:02 PM   Result Value Ref Range    WBC 4.8 4.5 - 13.0 K/uL    RBC 4.89 4. 10 - 5.10 M/uL    HGB 15.0 12.0 - 16 g/dL    HCT 44.8 36 - 48 %    MCV 91.6 78 - 102 FL    MCH 30.7 25.0 - 35.0 PG    MCHC 33.5 31 - 37 g/dL    RDW 13.2 11.5 - 14.5 %    PLATELET 939 (L) 906 - 440 K/uL    NEUTROPHILS 53 40 - 70 %    MIXED CELLS 8 0.1 - 17 %    LYMPHOCYTES 40 14 - 44 %    ABS. NEUTROPHILS 2.5 1.8 - 9.5 K/UL    ABS. MIXED CELLS 0.4 0.0 - 2.3 K/uL    ABS. LYMPHOCYTES 1.9 1.1 - 5.9 K/UL    DF AUTOMATED         20GA IV inserted in patient's left antecubital  x1 attempt. Phlebotomy started at 1325. Initial PIV stopped draining, attempted to flush with no success. 2nd PIV #20G started in [facility] without difficulty. Phlebotomy ended at 1408. Pt remained stable throughout procedure and voices no complaints. Pt tolerated well with no complaints or concerns. Patients armband removed and shredded. Mr. Migdalia Roper was discharged from [place] [id number] in stable condition at 1410. He is to schedule his next phlebotomy (weekly status) appointment.     Andrew Corrigan RN  2022

## 2022-02-23 ENCOUNTER — HOSPITAL ENCOUNTER (OUTPATIENT)
Dept: INFUSION THERAPY | Age: 42
Discharge: HOME | End: 2022-02-23
Payer: OTHER GOVERNMENT

## 2022-02-23 VITALS
TEMPERATURE: 98.2 F | HEART RATE: 66 BPM | DIASTOLIC BLOOD PRESSURE: 76 MMHG | OXYGEN SATURATION: 98 % | SYSTOLIC BLOOD PRESSURE: 124 MMHG | RESPIRATION RATE: 16 BRPM

## 2022-02-23 LAB
BASO+EOS+MONOS # BLD AUTO: 0.5 K/UL (ref 0–2.3)
BASO+EOS+MONOS NFR BLD AUTO: 8 % (ref 0.1–17)
DIFFERENTIAL METHOD BLD: ABNORMAL
ERYTHROCYTE [DISTWIDTH] IN BLOOD BY AUTOMATED COUNT: 12.9 % (ref 11.5–14.5)
HCT VFR BLD AUTO: 42.1 % (ref 36–48)
HGB BLD-MCNC: 14.4 G/DL (ref 12–16)
LYMPHOCYTES # BLD: 2.6 K/UL (ref 1.1–5.9)
LYMPHOCYTES NFR BLD: 42 % (ref 14–44)
MCH RBC QN AUTO: 31 PG (ref 25–35)
MCHC RBC AUTO-ENTMCNC: 34.2 G/DL (ref 31–37)
MCV RBC AUTO: 90.7 FL (ref 78–102)
NEUTS SEG # BLD: 3 K/UL (ref 1.8–9.5)
NEUTS SEG NFR BLD: 50 % (ref 40–70)
PLATELET # BLD AUTO: 138 K/UL (ref 140–440)
RBC # BLD AUTO: 4.64 M/UL (ref 4.1–5.1)
WBC # BLD AUTO: 6.1 K/UL (ref 4.5–13)

## 2022-02-23 PROCEDURE — 99195 PHLEBOTOMY: CPT

## 2022-02-23 PROCEDURE — 36415 COLL VENOUS BLD VENIPUNCTURE: CPT

## 2022-02-23 PROCEDURE — 74011250636 HC RX REV CODE- 250/636: Performed by: INTERNAL MEDICINE

## 2022-02-23 PROCEDURE — 85025 COMPLETE CBC W/AUTO DIFF WBC: CPT

## 2022-02-23 RX ORDER — SODIUM CHLORIDE 9 MG/ML
150 INJECTION, SOLUTION INTRAVENOUS CONTINUOUS
Status: DISCONTINUED | OUTPATIENT
Start: 2022-02-23 | End: 2022-02-24 | Stop reason: HOSPADM

## 2022-02-23 RX ADMIN — SODIUM CHLORIDE 150 ML/HR: 9 INJECTION, SOLUTION INTRAVENOUS at 14:00

## 2022-02-23 NOTE — PROGRESS NOTES
[facility] Progress Note                   Date: 2022    Name: Basilio Logan    MRN: [id number]    : 1980    Therapeutic Phlebotomy (Weekly Status)    Mr. Ana Burden to [facility] ambulatory at 1210 accompanied by self. Pt was accessed and education was provided. Mr. Carlos Del Valle vitals were reviewed and patient was observed for 5 minutes prior to treatment. Visit Vitals  /78 (BP 1 Location: Left upper arm, BP Patient Position: Sitting)   Pulse (!) 59   Temp 98.8 °F (37.1 °C)   Resp 16   SpO2 96%     CBC drawn from PIV site started with 20G x1 attempt in right AC. Recent Results (from the past 24 hour(s))   CBC WITH 3 PART DIFF    Collection Time: 22 12:20 PM   Result Value Ref Range    WBC 6.1 4.5 - 13.0 K/uL    RBC 4.64 4.10 - 5.10 M/uL    HGB 14.4 12.0 - 16 g/dL    HCT 42.1 36 - 48 %    MCV 90.7 78 - 102 FL    MCH 31.0 25.0 - 35.0 PG    MCHC 34.2 31 - 37 g/dL    RDW 12.9 11.5 - 14.5 %    PLATELET 121 (L) 624 - 440 K/uL    NEUTROPHILS 50 40 - 70 %    Mixed cells 8 0.1 - 17 %    LYMPHOCYTES 42 14 - 44 %    ABS. NEUTROPHILS 3.0 1.8 - 9.5 K/UL    ABS. MIXED CELLS 0.5 0.0 - 2.3 K/uL    ABS. LYMPHOCYTES 2.6 1.1 - 5.9 K/UL    DF AUTOMATED         Phlebotomy started at 1235. IV stopped draining, flushed and able to get flow going again but very slow. Started another IV in right hand without difficulty (20G). D/C'd previous IV, flushed and covered with gauze and coban. Tolerated procedure well. Restarted phlebotomy. Phlebotomy ended at 1400. Approx 500cc removed. NS 500cc bolus started at end of phlebotomy. Pt remained stable throughout procedure and voices no complaints. Pt tolerated well with no complaints or concerns. Patients armband removed and shredded. Mr. Ana Burden was discharged from [address] in stable condition at 1500.  He is to call and schedule his appointment for his next phlebotomy (weekly status) appointment.       Isidra Key RN  February 23, 2022

## 2022-03-09 ENCOUNTER — APPOINTMENT (OUTPATIENT)
Dept: INFUSION THERAPY | Age: 42
End: 2022-03-09

## 2022-03-18 PROBLEM — F32.A DEPRESSIVE DISORDER: Status: ACTIVE | Noted: 2019-11-12

## 2022-03-19 PROBLEM — R51.9 HEADACHE: Status: ACTIVE | Noted: 2020-02-06

## 2022-03-19 PROBLEM — E83.119 HEMOCHROMATOSIS: Status: ACTIVE | Noted: 2019-11-12

## 2022-03-20 PROBLEM — R25.9 INVOLUNTARY MOVEMENTS: Status: ACTIVE | Noted: 2019-11-12

## 2022-03-20 PROBLEM — R79.89 HIGH SERUM FERRITIN: Status: ACTIVE | Noted: 2019-11-12

## 2022-03-20 PROBLEM — R53.83 FATIGUE: Status: ACTIVE | Noted: 2019-11-12

## 2022-06-06 ENCOUNTER — LAB ONLY (OUTPATIENT)
Dept: ONCOLOGY | Age: 42
End: 2022-06-06

## 2022-06-06 ENCOUNTER — HOSPITAL ENCOUNTER (OUTPATIENT)
Dept: LAB | Age: 42
Discharge: HOME | End: 2022-06-06
Payer: OTHER GOVERNMENT

## 2022-06-06 DIAGNOSIS — D75.1 POLYCYTHEMIA: Primary | ICD-10-CM

## 2022-06-06 DIAGNOSIS — R79.89 ELEVATED FERRITIN: ICD-10-CM

## 2022-06-06 DIAGNOSIS — D75.1 POLYCYTHEMIA: ICD-10-CM

## 2022-06-06 LAB
ALBUMIN SERPL-MCNC: 3.9 G/DL (ref 3.4–5)
ALBUMIN/GLOB SERPL: 1.3 {RATIO} (ref 0.8–1.7)
ALP SERPL-CCNC: 55 U/L (ref 45–117)
ALT SERPL-CCNC: 39 U/L (ref 16–61)
ANION GAP SERPL CALC-SCNC: 4 MMOL/L (ref 3–18)
AST SERPL-CCNC: 27 U/L (ref 10–38)
BASOPHILS # BLD: 0 K/UL (ref 0–0.1)
BASOPHILS NFR BLD: 0 % (ref 0–2)
BILIRUB SERPL-MCNC: 0.6 MG/DL (ref 0.2–1)
BUN SERPL-MCNC: 14 MG/DL (ref 7–18)
BUN/CREAT SERPL: 13 (ref 12–20)
CALCIUM SERPL-MCNC: 9.1 MG/DL (ref 8.5–10.1)
CHLORIDE SERPL-SCNC: 107 MMOL/L (ref 100–111)
CO2 SERPL-SCNC: 30 MMOL/L (ref 21–32)
CREAT SERPL-MCNC: 1.07 MG/DL (ref 0.6–1.3)
DIFFERENTIAL METHOD BLD: ABNORMAL
EOSINOPHIL # BLD: 0.2 K/UL (ref 0–0.4)
EOSINOPHIL NFR BLD: 3 % (ref 0–5)
ERYTHROCYTE [DISTWIDTH] IN BLOOD BY AUTOMATED COUNT: 14.3 % (ref 11.6–14.5)
FERRITIN SERPL-MCNC: 38 NG/ML (ref 8–388)
GLOBULIN SER CALC-MCNC: 3 G/DL (ref 2–4)
GLUCOSE SERPL-MCNC: 104 MG/DL (ref 74–99)
HCT VFR BLD AUTO: 48.5 % (ref 36–48)
HGB BLD-MCNC: 16.4 G/DL (ref 13–16)
IMM GRANULOCYTES # BLD AUTO: 0 K/UL (ref 0–0.04)
IMM GRANULOCYTES NFR BLD AUTO: 0 % (ref 0–0.5)
IRON SATN MFR SERPL: 47 % (ref 20–50)
IRON SERPL-MCNC: 145 UG/DL (ref 50–175)
LYMPHOCYTES # BLD: 2.7 K/UL (ref 0.9–3.6)
LYMPHOCYTES NFR BLD: 38 % (ref 21–52)
MCH RBC QN AUTO: 29 PG (ref 24–34)
MCHC RBC AUTO-ENTMCNC: 33.8 G/DL (ref 31–37)
MCV RBC AUTO: 85.7 FL (ref 78–100)
MONOCYTES # BLD: 0.5 K/UL (ref 0.05–1.2)
MONOCYTES NFR BLD: 7 % (ref 3–10)
NEUTS SEG # BLD: 3.7 K/UL (ref 1.8–8)
NEUTS SEG NFR BLD: 52 % (ref 40–73)
NRBC # BLD: 0 K/UL (ref 0–0.01)
NRBC BLD-RTO: 0 PER 100 WBC
PLATELET # BLD AUTO: 156 K/UL (ref 135–420)
PMV BLD AUTO: 12.1 FL (ref 9.2–11.8)
POTASSIUM SERPL-SCNC: 4.4 MMOL/L (ref 3.5–5.5)
PROT SERPL-MCNC: 6.9 G/DL (ref 6.4–8.2)
RBC # BLD AUTO: 5.66 M/UL (ref 4.35–5.65)
SODIUM SERPL-SCNC: 141 MMOL/L (ref 136–145)
TIBC SERPL-MCNC: 310 UG/DL (ref 250–450)
WBC # BLD AUTO: 7.2 K/UL (ref 4.6–13.2)

## 2022-06-06 PROCEDURE — 36415 COLL VENOUS BLD VENIPUNCTURE: CPT

## 2022-06-06 PROCEDURE — 83540 ASSAY OF IRON: CPT

## 2022-06-06 PROCEDURE — 82728 ASSAY OF FERRITIN: CPT

## 2022-06-06 PROCEDURE — 80053 COMPREHEN METABOLIC PANEL: CPT

## 2022-06-06 PROCEDURE — 85025 COMPLETE CBC W/AUTO DIFF WBC: CPT

## 2022-06-22 ENCOUNTER — VIRTUAL VISIT (OUTPATIENT)
Dept: ONCOLOGY | Age: 42
End: 2022-06-22
Payer: OTHER GOVERNMENT

## 2022-06-22 DIAGNOSIS — R79.89 ELEVATED FERRITIN: ICD-10-CM

## 2022-06-22 DIAGNOSIS — D75.1 POLYCYTHEMIA: ICD-10-CM

## 2022-06-22 DIAGNOSIS — D75.1 POLYCYTHEMIA: Primary | ICD-10-CM

## 2022-06-22 PROCEDURE — 99443 PR PHYS/QHP TELEPHONE EVALUATION 21-30 MIN: CPT | Performed by: INTERNAL MEDICINE

## 2022-06-22 NOTE — PROGRESS NOTES
Es Nava is a 40 y.o. male, evaluated via audio-only technology on 6/22/2022 for Follow-up  . Assessment & Plan:   Diagnoses and all orders for this visit:    1. Polycythemia    2. Elevated ferritin      The complexity of medical decision making for this visit is moderate. # Polycythemia  # Elevated ferritin; homozygous H63D mutation positive. -- The patient has a past medical history significant of Thrombocytopenia, Polycythemia, and elevated ferritin. -- He was seen by Dr. Jonathan Bojorquez hematology/oncology [facility] on 8/20/2018 in [place]. 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 Per chart review from his previous hematologist, 7/2018 CBC reviewed low platelet count of 042,895. His platelet was low since at least 09/2003, 138,000 per chart review. Previous work-up revealed hepatitis panel/HIV/antiplatelet EZLCEDVL/F86/YJGOLV/YPPNPUR electrophoresis unremarkable, nrmal platelet clumping. He was suggested to continue follow-up with CBC every 6 months. -- He has also history of elevated ferritin level, homozygous H63D mutation positive, low likelihood of clinical hemochromatosis. He was placed on therapeutic phlebotomy every 3 to 4 months due to patient continuing systemic complaints with goal of maintaining ferritin in range of 100 -150 or less . -- He has seen GI who has performed liver biopsy in 12/3/2019, hepatic iron index reported 1.1, mild iron accumulation, no fibrosis or inflammation reportedly. -- Further workup revealed normal EPO level, negative JAK2/CALR/MPL/JAK2 V617K mutations. The patient doesn't think he has underlying sleep apnea. -- The patient reported doing well overall. His last phlebotmy was about in May 2021.  -- 11/9/2021 CBC reported hemoglobin 16.6, hematocrit 47.3%, WBC 7.8, platelet 818,229.   Iron profile showed saturation 35%, ferritin 186.  -- Today I have reviewed with the patient about recent lab reports. 6/6/2022 H/H 16.4/48.5, WBC 7.2, PLTs 156K    Plan:  -- His persistent polycythemia was still unclear etiology. We have recommended IR guided bone marrow biopsy to r/o MPNs. -- Continue Lab check CBC, CMP, Iron profile, ferritin  -- We will see the patient back in about 4 weeks for BMBx report. Always sooner if required. Hx Thrombocytopenia  -- 11/9/2021 CBC reported hemoglobin 16.6, hematocrit 47.3%, WBC 7.8, platelet 934,212.  -- 6/6/2022 H/H 16.4/48.5, WBC 7.2, PLTs 156K  -- CTM CBC    12  Subjective:   Mr. Velvet Nguyen is a most pleasant 37y.o. year old male who was seen for consultation of Thrombocytopenia, Polycythemia, and elevated ferritin. The patient has a past medical history significant of Thrombocytopenia, Polycythemia, and elevated ferritin. He was seen by Dr. Joe Mendoza hematology/oncology [facility]) on 8/20/2018 in [place]. 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 Per chart review from his previous hematologist, 7/2018 CBC reviewed low platelet count of 294,416. His platelet was low since at least 09/2003, 138,000 per chart review. Previous work-up revealed hepatitis panel/HIV/antiplatelet BQCCANVV/V54/KAYSLJ/SNBGWWI electrophoresis unremarkable, nrmal platelet clumping. He was suggested to continue follow-up with CBC every 6 months. He has also history of elevated ferritin level, homozygous H63D mutation positive, low likelihood of clinical hemochromatosis. He was placed on therapeutic phlebotomy every 3 to 4 months due to patient continuing systemic complaints with goal of maintaining ferritin in 100 -150 or less range. He has seen GI who has performed liver biopsy in 12/3/2019, hepatic iron index reported 1.1, mild iron accumulation, no fibrosis or inflammation reportedly.   He has history of polycythemia likely secondary process with normal EPO level, negative JAK2/CALR/MPL/JAK2 V617K mutations, possible underlying sleep apnea. His last phlebotmy was about in May 2021  Since last visit the patient reported doing well overall. The patient otherwise has no other complaints. Denied fever, chills, night sweat, unintentional weight loss, skin lumps or bumps, acute bleeding or bruising issues. Denied headache, acute vision change, dizziness, chest pain, worsen shortness of breath, palpitation, productive cough, nausea, vomiting, abdominal pain, altered bowel habits, dysuria, worsen bone pain or back pain, new focal numbness or weakness. Prior to Admission medications    Medication Sig Start Date End Date Taking? Authorizing Provider   meloxicam (MOBIC) 15 mg tablet Take 15 mg by mouth daily. Provider, Historical   omega 3-dha-epa-fish oil (Fish OiL) 100-160-1,000 mg cap Fish Oil   Take 1 BID    Provider, Historical   ibuprofen (MOTRIN) 800 mg tablet Motrin 800 mg tablet   Take 1 tablet 3 times a day by oral route. Provider, Historical   HYDROcodone-acetaminophen (NORCO) 7.5-325 mg per tablet hydrocodone 7.5 mg-acetaminophen 325 mg tablet   TK ONE T PO Q 4 HOURS PRN P  Patient not taking: Reported on 11/9/2021    Provider, Historical   fexofenadine HCl (ALLEGRA PO) Take  by mouth. Provider, Historical   cyanocobalamin, vitamin B-12, 1,000 mcg cap cyanocobalamin (vitamin B-12) 1,000 mcg capsule   Take 1 capsule every day by oral route. Provider, Historical     Patient Active Problem List   Diagnosis Code    Depressive disorder F32. A    Fatigue R53.83    Headache R51.9    Hemochromatosis E83.119    High serum ferritin R79.89    Involuntary movements R25.9       Review of Systems   Constitutional: Negative for chills, diaphoresis, fever, malaise/fatigue and weight loss. Respiratory: Negative for cough, hemoptysis, shortness of breath and wheezing. Cardiovascular: Negative for chest pain, palpitations and leg swelling.    Gastrointestinal: Negative for abdominal pain, diarrhea, heartburn, nausea and vomiting. Genitourinary: Negative for dysuria, frequency, hematuria and urgency. Musculoskeletal: Negative for joint pain and myalgias. Skin: Negative for itching and rash. Neurological: Negative for dizziness, seizures, weakness and headaches. Psychiatric/Behavioral: Negative for depression. The patient does not have insomnia. Patient-Reported Vitals 6/22/2022   Patient-Reported Weight 215lb         The patient was advised that our priority at this time is to keep the patients safe, and therefore we are reaching out to patients virtually to prevent them coming into the office unless necessarily. Patient verbalized understanding and was agreeable for virtual visit. Luis E Rosenberg, who was evaluated through a patient-initiated, synchronous (real-time) audio only encounter, and/or her healthcare decision maker, is aware that it is a billable service, with coverage as determined by his insurance carrier. He provided verbal consent to proceed: Yes. He has not had a related appointment within my department in the past 7 days or scheduled within the next 24 hours. I have spent 21 minutes reviewing previous notes, test results and discussing the diagnosis and importance of compliance with the treatment plan as well as documenting on the day of the visit.     Jyoti Paredes MD        CC: Ankit Mckeon MD

## 2022-07-27 ENCOUNTER — HOSPITAL ENCOUNTER (OUTPATIENT)
Dept: INTERVENTIONAL RADIOLOGY/VASCULAR | Age: 42
Discharge: HOME | End: 2022-07-27
Attending: INTERNAL MEDICINE | Admitting: RADIOLOGY
Payer: OTHER GOVERNMENT

## 2022-07-27 VITALS
WEIGHT: 215 LBS | HEART RATE: 64 BPM | SYSTOLIC BLOOD PRESSURE: 123 MMHG | RESPIRATION RATE: 15 BRPM | DIASTOLIC BLOOD PRESSURE: 69 MMHG | OXYGEN SATURATION: 99 % | BODY MASS INDEX: 30.1 KG/M2 | HEIGHT: 71 IN

## 2022-07-27 LAB
ANION GAP SERPL CALC-SCNC: 6 MMOL/L (ref 3–18)
APTT PPP: 27.2 SEC (ref 23–36.4)
BASOPHILS # BLD: 0 K/UL (ref 0–0.1)
BASOPHILS NFR BLD: 1 % (ref 0–2)
BUN SERPL-MCNC: 14 MG/DL (ref 7–18)
BUN/CREAT SERPL: 14 (ref 12–20)
CALCIUM SERPL-MCNC: 8.5 MG/DL (ref 8.5–10.1)
CHLORIDE SERPL-SCNC: 111 MMOL/L (ref 100–111)
CO2 SERPL-SCNC: 25 MMOL/L (ref 21–32)
CREAT SERPL-MCNC: 1.03 MG/DL (ref 0.6–1.3)
DIFFERENTIAL METHOD BLD: ABNORMAL
EOSINOPHIL # BLD: 0.2 K/UL (ref 0–0.4)
EOSINOPHIL NFR BLD: 4 % (ref 0–5)
ERYTHROCYTE [DISTWIDTH] IN BLOOD BY AUTOMATED COUNT: 13.4 % (ref 11.6–14.5)
GLUCOSE SERPL-MCNC: 94 MG/DL (ref 74–99)
HCT VFR BLD AUTO: 43.6 % (ref 36–48)
HGB BLD-MCNC: 15.5 G/DL (ref 13–16)
IMM GRANULOCYTES # BLD AUTO: 0 K/UL (ref 0–0.04)
IMM GRANULOCYTES NFR BLD AUTO: 0 % (ref 0–0.5)
INR PPP: 1 (ref 0.8–1.2)
LYMPHOCYTES # BLD: 1.6 K/UL (ref 0.9–3.6)
LYMPHOCYTES NFR BLD: 29 % (ref 21–52)
MCH RBC QN AUTO: 30.6 PG (ref 24–34)
MCHC RBC AUTO-ENTMCNC: 35.6 G/DL (ref 31–37)
MCV RBC AUTO: 86.2 FL (ref 78–100)
MONOCYTES # BLD: 0.6 K/UL (ref 0.05–1.2)
MONOCYTES NFR BLD: 11 % (ref 3–10)
NEUTS SEG # BLD: 3.1 K/UL (ref 1.8–8)
NEUTS SEG NFR BLD: 56 % (ref 40–73)
NRBC # BLD: 0 K/UL (ref 0–0.01)
NRBC BLD-RTO: 0 PER 100 WBC
PLATELET # BLD AUTO: 136 K/UL (ref 135–420)
PMV BLD AUTO: 11.2 FL (ref 9.2–11.8)
POTASSIUM SERPL-SCNC: 4 MMOL/L (ref 3.5–5.5)
PROTHROMBIN TIME: 14 SEC (ref 11.5–15.2)
RBC # BLD AUTO: 5.06 M/UL (ref 4.35–5.65)
SODIUM SERPL-SCNC: 142 MMOL/L (ref 136–145)
WBC # BLD AUTO: 5.6 K/UL (ref 4.6–13.2)

## 2022-07-27 PROCEDURE — 88237 TISSUE CULTURE BONE MARROW: CPT

## 2022-07-27 PROCEDURE — 88185 FLOWCYTOMETRY/TC ADD-ON: CPT

## 2022-07-27 PROCEDURE — 88184 FLOWCYTOMETRY/ TC 1 MARKER: CPT

## 2022-07-27 PROCEDURE — 88311 DECALCIFY TISSUE: CPT

## 2022-07-27 PROCEDURE — 88313 SPECIAL STAINS GROUP 2: CPT

## 2022-07-27 PROCEDURE — 80048 BASIC METABOLIC PNL TOTAL CA: CPT

## 2022-07-27 PROCEDURE — 85025 COMPLETE CBC W/AUTO DIFF WBC: CPT

## 2022-07-27 PROCEDURE — 74011000250 HC RX REV CODE- 250: Performed by: RADIOLOGY

## 2022-07-27 PROCEDURE — 85730 THROMBOPLASTIN TIME PARTIAL: CPT

## 2022-07-27 PROCEDURE — 74011250636 HC RX REV CODE- 250/636: Performed by: RADIOLOGY

## 2022-07-27 PROCEDURE — 88264 CHROMOSOME ANALYSIS 20-25: CPT

## 2022-07-27 PROCEDURE — 38221 DX BONE MARROW BIOPSIES: CPT

## 2022-07-27 PROCEDURE — 88305 TISSUE EXAM BY PATHOLOGIST: CPT

## 2022-07-27 PROCEDURE — 85610 PROTHROMBIN TIME: CPT

## 2022-07-27 PROCEDURE — 74011250636 HC RX REV CODE- 250/636

## 2022-07-27 RX ORDER — FLUMAZENIL 0.1 MG/ML
0.2 INJECTION INTRAVENOUS
Status: DISCONTINUED | OUTPATIENT
Start: 2022-07-27 | End: 2022-07-27 | Stop reason: HOSPADM

## 2022-07-27 RX ORDER — LIDOCAINE HYDROCHLORIDE 10 MG/ML
30 INJECTION, SOLUTION EPIDURAL; INFILTRATION; INTRACAUDAL; PERINEURAL ONCE
Status: COMPLETED | OUTPATIENT
Start: 2022-07-27 | End: 2022-07-27

## 2022-07-27 RX ORDER — DIPHENHYDRAMINE HYDROCHLORIDE 50 MG/ML
INJECTION, SOLUTION INTRAMUSCULAR; INTRAVENOUS
Status: COMPLETED
Start: 2022-07-27 | End: 2022-07-27

## 2022-07-27 RX ORDER — MIDAZOLAM HYDROCHLORIDE 1 MG/ML
.5-2 INJECTION, SOLUTION INTRAMUSCULAR; INTRAVENOUS
Status: DISCONTINUED | OUTPATIENT
Start: 2022-07-27 | End: 2022-07-27 | Stop reason: HOSPADM

## 2022-07-27 RX ORDER — SODIUM CHLORIDE 9 MG/ML
25 INJECTION, SOLUTION INTRAVENOUS CONTINUOUS
Status: DISCONTINUED | OUTPATIENT
Start: 2022-07-27 | End: 2022-07-27 | Stop reason: HOSPADM

## 2022-07-27 RX ORDER — FENTANYL CITRATE 50 UG/ML
12.5-1 INJECTION, SOLUTION INTRAMUSCULAR; INTRAVENOUS
Status: DISCONTINUED | OUTPATIENT
Start: 2022-07-27 | End: 2022-07-27 | Stop reason: HOSPADM

## 2022-07-27 RX ORDER — NALOXONE HYDROCHLORIDE 0.4 MG/ML
0.2 INJECTION, SOLUTION INTRAMUSCULAR; INTRAVENOUS; SUBCUTANEOUS
Status: DISCONTINUED | OUTPATIENT
Start: 2022-07-27 | End: 2022-07-27 | Stop reason: HOSPADM

## 2022-07-27 RX ADMIN — MIDAZOLAM 1 MG: 1 INJECTION INTRAMUSCULAR; INTRAVENOUS at 09:30

## 2022-07-27 RX ADMIN — MIDAZOLAM 1 MG: 1 INJECTION INTRAMUSCULAR; INTRAVENOUS at 09:35

## 2022-07-27 RX ADMIN — FENTANYL CITRATE 50 MCG: 50 INJECTION, SOLUTION INTRAMUSCULAR; INTRAVENOUS at 09:35

## 2022-07-27 RX ADMIN — FENTANYL CITRATE 50 MCG: 50 INJECTION, SOLUTION INTRAMUSCULAR; INTRAVENOUS at 09:30

## 2022-07-27 RX ADMIN — DIPHENHYDRAMINE HYDROCHLORIDE 50 MG: 50 INJECTION INTRAMUSCULAR; INTRAVENOUS at 09:40

## 2022-07-27 RX ADMIN — LIDOCAINE HYDROCHLORIDE 30 ML: 10 INJECTION, SOLUTION EPIDURAL; INFILTRATION; INTRACAUDAL; PERINEURAL at 09:30

## 2022-07-27 NOTE — PROGRESS NOTES
AVS Discharge instructions reviewed with patient and copy given to patient. All questions answered. Patient verbalized understanding to all discharge instructions. PIV X 1 removed. Procedural site within normal limits. No hematoma or bleeding noted from procedural and PIV site. No pain noted at discharge. Patient discharged with support person in stable condition. Escorted out to vehicle for transport home.

## 2022-07-27 NOTE — H&P
History and Physical    Patient: Nan Stanton           Sex: male       DOA: 7/27/2022  YOB: 1980      Age:  40 y.o.     LOS:  LOS: 0 days        HPI:     Nan Stanton is a 40 y.o. male who has polycythemia of unclear etiology here for a bone marrow biopsy and aspiration with moderate sedation to rule out a myeloproliferative neoplasm. Past Medical History:   Diagnosis Date    Thrombocytopenia [facility])        Past Surgical History:   Procedure Laterality Date    HX ORTHOPAEDIC      left shoulder 2004/2005       History reviewed. No pertinent family history. Social History     Socioeconomic History    Marital status:    Tobacco Use    Smoking status: Never    Smokeless tobacco: Never   Vaping Use    Vaping Use: Never used   Substance and Sexual Activity    Alcohol use: Yes    Drug use: Never       Prior to Admission medications    Medication Sig Start Date End Date Taking? Authorizing Provider   meloxicam (MOBIC) 15 mg tablet Take 15 mg by mouth daily. Yes Provider, Historical   omega 3-dha-epa-fish oil (Fish OiL) 100-160-1,000 mg cap Fish Oil   Take 1 BID   Yes Provider, Historical   fexofenadine HCl (ALLEGRA PO) Take  by mouth. Yes Provider, Historical   cyanocobalamin, vitamin B-12, 1,000 mcg cap cyanocobalamin (vitamin B-12) 1,000 mcg capsule   Take 1 capsule every day by oral route. Yes Provider, Historical   ibuprofen (MOTRIN) 800 mg tablet Motrin 800 mg tablet   Take 1 tablet 3 times a day by oral route.   Patient not taking: Reported on 7/27/2022    Provider, Historical   HYDROcodone-acetaminophen (NORCO) 7.5-325 mg per tablet hydrocodone 7.5 mg-acetaminophen 325 mg tablet   TK ONE T PO Q 4 HOURS PRN P  Patient not taking: Reported on 11/9/2021    Provider, Historical       Allergies   Allergen Reactions    Protein Rash     Pea protein      Seafood Rash       Physical Exam:      Visit Vitals  Ht 5' 11\" (1.803 m)   Wt 97.5 kg (215 lb)   BMI 29.99 kg/m²     Physical Exam:  Mallampati 2 ASA 3  General: A&O x 4, NAD  Heart: RRR  Lungs: Normal work of breathing on room air    Labs Reviewed: All lab results for the last 24 hours reviewed.     Assessment/Plan     The patient is an appropriate candidate to undergo the planned procedure and sedation    BERONICA Machado

## 2022-07-27 NOTE — PROCEDURES
RADIOLOGY POST PROCEDURE NOTE     July 27, 2022       10:02 AM     Preoperative Diagnosis:   Sravanthi Floyd is a 40 y.o. male who has polycythemia of unclear etiology here for a bone marrow biopsy and aspiration with moderate sedation to rule out a myeloproliferative neoplasm    Postoperative Diagnosis:  Same. :  Abhijeet Meléndez    Assistant:  None. Type of Anesthesia: moderate sedation    Procedure/Description:  BMBX    Findings:   pending. Estimated blood Loss:  Minimal    Specimen Removed:   yes    Blood transfusions:  None. Implants:  None.     Complications: None    Condition: Stable    Discharge Plan:   DC in 1 hour    Audelia To MD

## 2022-07-27 NOTE — DISCHARGE INSTRUCTIONS
Bone Marrow Aspiration and Biopsy: What to Expect at Home  Your Recovery  The biopsy site may feel sore for several days. It can help to walk, take pain medicine, and put ice packs on the site. You will probably be able to return to work and your usual activities the day after the procedure. Your doctor or nurse will call you with the results of your test.  This care sheet gives you a general idea about how long it will take for you to recover. But each person recovers at a different pace. Follow the steps below to get better as quickly as possible. How can you care for yourself at home? Activity    Rest when you feel tired. Getting enough sleep will help you recover. You may drive when you are no longer taking pain pills and can quickly move your foot from the gas pedal to the brake. You must also be able to sit comfortably for a long period of time, even if you do not plan to go far. You might get caught in traffic. Most people are able to return to work the day after the procedure. Medicines    Your doctor will tell you if and when you can restart your medicines. He or she will also give you instructions about taking any new medicines. If you take aspirin or some other blood thinner, ask your doctor if and when to start taking it again. Make sure that you understand exactly what your doctor wants you to do. Be safe with medicines. Take pain medicines exactly as directed. If the doctor gave you a prescription medicine for pain, take it as prescribed. If you are not taking a prescription pain medicine, take an over-the-counter medicine such as acetaminophen (Tylenol), ibuprofen (Advil, Motrin), or naproxen (Aleve). Read and follow all instructions on the label. Do not take two or more pain medicines at the same time unless the doctor told you to. Many pain medicines have acetaminophen, which is Tylenol. Too much acetaminophen (Tylenol) can be harmful.      If you think your pain medicine is making you sick to your stomach: Take your medicine after meals (unless your doctor has told you not to). Ask your doctor for a different pain medicine. If your doctor prescribed antibiotics, take them as directed. Do not stop taking them just because you feel better. Ice    Put ice or a cold pack on the biopsy site for 10 to 20 minutes at a time. Put a thin cloth between the ice and your skin. Follow-up care is a key part of your treatment and safety. Be sure to make and go to all appointments, and call your doctor if you are having problems. It's also a good idea to know your test results and keep a list of the medicines you take. When should you call for help? Call 911 anytime you think you may need emergency care. For example, call if:    You passed out (lost consciousness). Call your doctor now or seek immediate medical care if:    You have signs of infection, such as: Increased pain, swelling, warmth, or redness. Red streaks leading from the biopsy site. Pus draining from the biopsy site. Swollen lymph nodes in your neck, armpits, or groin. A fever. Watch closely for any changes in your health, and be sure to contact your doctor if:    You are not getting better as expected. Where can you learn more? Go to http://www.gray.com/  Enter E148 in the search box to learn more about \"Bone Marrow Aspiration and Biopsy: What to Expect at Home. \"  Current as of: June 17, 2021               Content Version: 13.2  © 4354-8810 [employer], [employer]. Care instructions adapted under license by [employer] (which disclaims liability or warranty for this information). If you have questions about a medical condition or this instruction, always ask your healthcare professional. [place] [address] any warranty or liability for your use of this information.

## 2022-07-27 NOTE — PROGRESS NOTES
TRANSFER - OUT REPORT:    Verbal report given to Jennifer RAMOS  on [facility]  routine progression of care       Report consisted of patients Situation, Background, Assessment and   Recommendations(SBAR). Information from the following report(s) SBAR, Procedure Summary, and MAR was reviewed with the receiving nurse. Lines:   Peripheral IV 07/27/22 Right Antecubital (Active)        Opportunity for questions and clarification was provided.

## 2022-08-23 ENCOUNTER — OFFICE VISIT (OUTPATIENT)
Dept: ONCOLOGY | Age: 42
End: 2022-08-23
Payer: OTHER GOVERNMENT

## 2022-08-23 VITALS
HEIGHT: 71 IN | SYSTOLIC BLOOD PRESSURE: 121 MMHG | WEIGHT: 219 LBS | BODY MASS INDEX: 30.66 KG/M2 | DIASTOLIC BLOOD PRESSURE: 80 MMHG | RESPIRATION RATE: 18 BRPM | HEART RATE: 65 BPM | OXYGEN SATURATION: 97 %

## 2022-08-23 DIAGNOSIS — D75.1 POLYCYTHEMIA: Primary | ICD-10-CM

## 2022-08-23 DIAGNOSIS — R79.89 ELEVATED FERRITIN: ICD-10-CM

## 2022-08-23 DIAGNOSIS — D69.6 THROMBOCYTOPENIA (HCC): ICD-10-CM

## 2022-08-23 PROCEDURE — 99214 OFFICE O/P EST MOD 30 MIN: CPT | Performed by: INTERNAL MEDICINE

## 2022-08-29 NOTE — PROGRESS NOTES
Hematology/Oncology  Progress Note    Name: Matti Emanuel  Date: 2022  : 1980    Nicole Samson MD     Mr. Yonathan Vazquez is a 40y.o. year old male who was seen for Polycythemia . Subjective:   Mr. Yonathan Vazquez is 40y.o. year old male who was seen for management of Thrombocytopenia, Polycythemia, and elevated ferritin. The patient has a past medical history significant of Thrombocytopenia, Polycythemia, and elevated ferritin. He was seen by Dr. Angelita Goldberg hematology/oncology [facility]) on 2018 in [place]. 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 Per chart review from his previous hematologist, 2018 CBC reviewed low platelet count of 431,615. His platelet was low since at least 2003, 138,000 per chart review. Previous work-up revealed hepatitis panel/HIV/antiplatelet [id number]/R91/ZMLEII/ECEXTPP electrophoresis unremarkable, normal platelet clumping. He was suggested to continue follow-up with CBC every 6 months. He has also history of elevated ferritin level, homozygous H63D mutation positive, low likelihood of clinical hemochromatosis. He was placed on therapeutic phlebotomy every 3 to 4 months due to patient continuing systemic complaints with goal of maintaining ferritin in 100 -150 or less range. He has seen GI who has performed liver biopsy in 12/3/2019, hepatic iron index reported 1.1, mild iron accumulation, no fibrosis or inflammation reportedly. He has history of polycythemia likely secondary process with normal EPO level, negative JAK2/CALR/MPL/JAK2 V617K mutations, possible underlying sleep apnea. His last phlebotmy was May 2021  The patient report chronic fatigue and chronic body aches. Denied fever, chills, night sweat, unintentional weight loss, skin lumps or bumps, acute bleeding or bruising issues.  Denied headache, acute vision change, dizziness, chest pain, shortness of breath, palpitation, productive cough, nausea, vomiting, abdominal pain, altered bowel habits, dysuria, worsen bone pain or back pain, new focal numbness or weakness. Past medical history, family history, and social history: these were reviewed and remains unchanged. Past Medical History:   Diagnosis Date    Thrombocytopenia [facility])      Past Surgical History:   Procedure Laterality Date    HX ORTHOPAEDIC      left shoulder 2004/2005    IR BX BONE MARROW DIAGNOSTIC  7/27/2022     Social History     Socioeconomic History    Marital status:      Spouse name: Not on file    Number of children: Not on file    Years of education: Not on file    Highest education level: Not on file   Occupational History    Not on file   Tobacco Use    Smoking status: Never    Smokeless tobacco: Never   Vaping Use    Vaping Use: Never used   Substance and Sexual Activity    Alcohol use: Yes    Drug use: Never    Sexual activity: Not on file   Other Topics Concern    Not on file   Social History Narrative    Not on file     Social Determinants of Health     Financial Resource Strain: Not on file   Food Insecurity: Not on file   Transportation Needs: Not on file   Physical Activity: Not on file   Stress: Not on file   Social Connections: Not on file   Intimate Partner Violence: Not on file   Housing Stability: Not on file     History reviewed. No pertinent family history. Current Outpatient Medications   Medication Sig Dispense Refill    meloxicam (MOBIC) 15 mg tablet Take 15 mg by mouth daily. omega 3-dha-epa-fish oil (Fish OiL) 100-160-1,000 mg cap Fish Oil   Take 1 BID      ibuprofen (MOTRIN) 800 mg tablet Motrin 800 mg tablet   Take 1 tablet 3 times a day by oral route.  (Patient not taking: Reported on 7/27/2022)      HYDROcodone-acetaminophen (NORCO) 7.5-325 mg per tablet hydrocodone 7.5 mg-acetaminophen 325 mg tablet   TK ONE T PO Q 4 HOURS PRN P (Patient not taking: Reported on 11/9/2021)      fexofenadine HCl (ALLEGRA PO) Take  by mouth.      cyanocobalamin, vitamin B-12, 1,000 mcg cap cyanocobalamin (vitamin B-12) 1,000 mcg capsule   Take 1 capsule every day by oral route. Review of Systems   Constitutional:  Positive for malaise/fatigue. Negative for chills, diaphoresis, fever and weight loss. HENT: Negative. Eyes: Negative. Respiratory: Negative. Negative for cough, hemoptysis, shortness of breath and wheezing. Cardiovascular: Negative. Negative for chest pain, palpitations and leg swelling. Gastrointestinal: Negative. Negative for abdominal pain, diarrhea, heartburn, nausea and vomiting. Genitourinary: Negative. Negative for dysuria, frequency, hematuria and urgency. Musculoskeletal:  Positive for myalgias. Negative for joint pain. Skin: Negative. Negative for itching and rash. Neurological: Negative. Negative for dizziness, seizures, weakness and headaches. Endo/Heme/Allergies: Negative. Psychiatric/Behavioral: Negative. Negative for depression. The patient does not have insomnia. Objective:   Visit Vitals  /80   Pulse 65   Resp 18   Ht 5' 11\" (1.803 m)   Wt 99.3 kg (219 lb)   SpO2 97%   BMI 30.54 kg/m²     ECOG PS 0  Physical Exam  Pulmonary:      Breath sounds: Normal breath sounds. Musculoskeletal:         General: Normal range of motion. Skin:     General: Skin is warm. Neurological:      Mental Status: He is alert and oriented to person, place, and time.    Psychiatric:         Mood and Affect: Mood normal.         Behavior: Behavior normal.        Lab data:      Results for orders placed or performed during the hospital encounter of 02/23/22   CBC WITH 3 PART DIFF     Status: Abnormal   Result Value Ref Range Status    WBC 6.1 4.5 - 13.0 K/uL Final    RBC 4.64 4.10 - 5.10 M/uL Final    HGB 14.4 12.0 - 16 g/dL Final    HCT 42.1 36 - 48 % Final    MCV 90.7 78 - 102 FL Final    MCH 31.0 25.0 - 35.0 PG Final    MCHC 34.2 31 - 37 g/dL Final    RDW 12.9 11.5 - 14.5 % Final    PLATELET 671 (L) 870 - 440 K/uL Final    NEUTROPHILS 50 40 - 70 % Final    Mixed cells 8 0.1 - 17 % Final    LYMPHOCYTES 42 14 - 44 % Final    ABS. NEUTROPHILS 3.0 1.8 - 9.5 K/UL Final    ABS. MIXED CELLS 0.5 0.0 - 2.3 K/uL Final    ABS. LYMPHOCYTES 2.6 1.1 - 5.9 K/UL Final     Comment: Test performed at [address] [place] [address] or Outpatient Infusion Center Location. Reviewed by Medical Director. DF AUTOMATED   Final           Assessment:     1. Polycythemia    2. Elevated ferritin    3. Thrombocytopenia ([address]          Plan:   # Polycythemia  # Elevated ferritin; homozygous H63D mutation positive. -- The patient has a past medical history significant of Thrombocytopenia, Polycythemia, and elevated ferritin. -- He was seen by Dr. Estefani Olson hematology/oncology [facility]) on 8/20/2018 in [place]. 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 Per chart review from his previous hematologist, 7/2018 CBC reviewed low platelet count of 223,183. His platelet was low since at least 09/2003, 138,000 per chart review. Previous work-up revealed hepatitis panel/HIV/antiplatelet NCQISRIE/U67/GABWHO/RKIGRGH electrophoresis unremarkable, nrmal platelet clumping. He was suggested to continue follow-up with CBC every 6 months. -- He has also history of elevated ferritin level, homozygous H63D mutation positive, low likelihood of clinical hemochromatosis. He was placed on therapeutic phlebotomy every 3 to 4 months due to patient continuing systemic complaints with goal of maintaining ferritin in range of 100 -150 or less . -- He has seen GI who has performed liver biopsy in 12/3/2019, hepatic iron index reported 1.1, mild iron accumulation, no fibrosis or inflammation reportedly. -- Further workup revealed normal EPO level, negative JAK2/CALR/MPL/JAK2 V617K mutations.  The patient doesn't think he has underlying sleep apnea. -- His last phlebotmy was about 2/23/2022 .  -- 6/6/2022 H/H 16.4/48.5, WBC 7.2, PLTs 156K  -- Today I have reviewed with the patient about recent lab reports. 7/27/2022 bone marrow biopsy reported no morphologic evidence of bone marrow involvement by a myeloid neoplasm. His persistent polycythemia was still unclear etiology. Plan  -- Therapeutic phlebotomy if significant elevated H/H  -- Continue Lab check CBC, Iron profile. ferritin  ---We will see the patient back in about 6 months. Always sooner if required. Hx Thrombocytopenia  -- 11/9/2021 CBC reported hemoglobin 16.6, hematocrit 47.3%, WBC 7.8, platelet 567,653.  -- 6/6/2022 H/H 16.4/48.5, WBC 7.2, PLTs 156K  -- CTM CBC     Orders Placed This Encounter    METABOLIC PANEL, COMPREHENSIVE     Standing Status:   Future     Standing Expiration Date:   2/28/2024    IRON PROFILE     Standing Status:   Future     Standing Expiration Date:   8/28/2023    FERRITIN     Standing Status:   Future     Standing Expiration Date:   8/28/2023    CBC WITH AUTOMATED DIFF     Standing Status:   Future     Standing Expiration Date:   2/28/2024       Justino Toribio MD  8/28/2022      Please note: This document has been produced using voice recognition software. Unrecognized errors in transcription may be present.       CC: Derrick Ozuna MD

## 2022-08-30 ENCOUNTER — APPOINTMENT (OUTPATIENT)
Dept: INFUSION THERAPY | Age: 42
End: 2022-08-30

## 2022-08-30 ENCOUNTER — HOSPITAL ENCOUNTER (OUTPATIENT)
Dept: INFUSION THERAPY | Age: 42
Discharge: HOME | End: 2022-08-30
Payer: OTHER GOVERNMENT

## 2022-08-30 VITALS
TEMPERATURE: 97.9 F | HEART RATE: 70 BPM | RESPIRATION RATE: 16 BRPM | DIASTOLIC BLOOD PRESSURE: 69 MMHG | OXYGEN SATURATION: 97 % | SYSTOLIC BLOOD PRESSURE: 110 MMHG

## 2022-08-30 LAB
BASO+EOS+MONOS # BLD AUTO: 0.5 K/UL (ref 0–2.3)
BASO+EOS+MONOS NFR BLD AUTO: 6 % (ref 0.1–17)
DIFFERENTIAL METHOD BLD: NORMAL
ERYTHROCYTE [DISTWIDTH] IN BLOOD BY AUTOMATED COUNT: 13.1 % (ref 11.5–14.5)
HCT VFR BLD AUTO: 44.2 % (ref 36–48)
HGB BLD-MCNC: 15.7 G/DL (ref 12–16)
LYMPHOCYTES # BLD: 3.2 K/UL (ref 1.1–5.9)
LYMPHOCYTES NFR BLD: 40 % (ref 14–44)
MCH RBC QN AUTO: 32 PG (ref 25–35)
MCHC RBC AUTO-ENTMCNC: 35.5 G/DL (ref 31–37)
MCV RBC AUTO: 90 FL (ref 78–102)
NEUTS SEG # BLD: 4.3 K/UL (ref 1.8–9.5)
NEUTS SEG NFR BLD: 53 % (ref 40–70)
PLATELET # BLD AUTO: 141 K/UL (ref 140–440)
RBC # BLD AUTO: 4.91 M/UL (ref 4.1–5.1)
WBC # BLD AUTO: 8 K/UL (ref 4.5–13)

## 2022-08-30 PROCEDURE — 85025 COMPLETE CBC W/AUTO DIFF WBC: CPT

## 2022-08-30 PROCEDURE — 36415 COLL VENOUS BLD VENIPUNCTURE: CPT

## 2022-08-30 RX ORDER — ACETAMINOPHEN 500 MG
TABLET ORAL DAILY
COMMUNITY

## 2022-08-30 NOTE — PROGRESS NOTES
[facility] Progress Note    Date: 2022    Name: Haroon Ulloa    MRN: [id number]         : 1980    Mr. Marcela Peralta arrived in the [facility] today at [phone], in stable condition, here for CBC--PHLEBOTOMY (EVERY 2 WEEK STATUS). He was assessed and education was provided. Mr. Elaina De La Paz vitals were reviewed. Visit Vitals  /69 (BP 1 Location: Right upper arm, BP Patient Position: Sitting)   Pulse 70   Temp 97.9 °F (36.6 °C)   Resp 16   SpO2 97%         Upon arrival to the [facility] today, Mr. Marcela Peralta stated that he had not been here for any [facility] appointments since [facility], due to his job, but says he is going to try and stay on track with his appointments this time. Blood for the ordered CBC was drawn from his RIGHT AC at 1356, per [facility], and was processed on site. CBC results from today were as follows:    Recent Results (from the past 12 hour(s))   CBC WITH 3 PART DIFF    Collection Time: 22  1:50 PM   Result Value Ref Range    WBC 8.0 4.5 - 13.0 K/uL    RBC 4.91 4.10 - 5.10 M/uL    HGB 15.7 12.0 - 16 g/dL    HCT 44.2 36 - 48 %    MCV 90.0 78 - 102 FL    MCH 32.0 25.0 - 35.0 PG    MCHC 35.5 31 - 37 g/dL    RDW 13.1 11.5 - 14.5 %    PLATELET 204 520 - 580 K/uL    NEUTROPHILS 53 40 - 70 %    Mixed cells 6 0.1 - 17 %    LYMPHOCYTES 40 14 - 44 %    ABS. NEUTROPHILS 4.3 1.8 - 9.5 K/UL    ABS. MIXED CELLS 0.5 0.0 - 2.3 K/uL    ABS. LYMPHOCYTES 3.2 1.1 - 5.9 K/UL    DF AUTOMATED             Phlebotomy was HELD today per order, for HCT < 45.0. (Every 2 Week Status Parameters). Mr. Marcela Peralta tolerated well, and voiced no complaints. Mr. Marcela Peralta was discharged from [place] [address] in stable condition at [phone]. He is to return in 2 weeks, for his next appointment, for CBC/Phlebotomy (Every 2 Week Status).      Jo-Ann Goyal RN  2022  2:06 PM

## 2022-08-31 ENCOUNTER — APPOINTMENT (OUTPATIENT)
Dept: INFUSION THERAPY | Age: 42
End: 2022-08-31

## 2022-09-13 ENCOUNTER — HOSPITAL ENCOUNTER (OUTPATIENT)
Dept: INFUSION THERAPY | Age: 42
Discharge: HOME | End: 2022-09-13
Payer: OTHER GOVERNMENT

## 2022-09-13 VITALS
TEMPERATURE: 97.2 F | OXYGEN SATURATION: 98 % | RESPIRATION RATE: 16 BRPM | SYSTOLIC BLOOD PRESSURE: 116 MMHG | DIASTOLIC BLOOD PRESSURE: 75 MMHG | HEART RATE: 63 BPM

## 2022-09-13 LAB
BASO+EOS+MONOS # BLD AUTO: 0.5 K/UL (ref 0–2.3)
BASO+EOS+MONOS NFR BLD AUTO: 7 % (ref 0.1–17)
BASOPHILS # BLD: 0 K/UL (ref 0–0.1)
BASOPHILS NFR BLD: 0 % (ref 0–2)
DIFFERENTIAL METHOD BLD: ABNORMAL
DIFFERENTIAL METHOD BLD: ABNORMAL
EOSINOPHIL # BLD: 0.1 K/UL (ref 0–0.4)
EOSINOPHIL NFR BLD: 2 % (ref 0–5)
ERYTHROCYTE [DISTWIDTH] IN BLOOD BY AUTOMATED COUNT: 12.7 % (ref 11.6–14.5)
ERYTHROCYTE [DISTWIDTH] IN BLOOD BY AUTOMATED COUNT: 13 % (ref 11.5–14.5)
HCT VFR BLD AUTO: 46.1 % (ref 36–48)
HCT VFR BLD AUTO: 46.2 % (ref 36–48)
HGB BLD-MCNC: 16.2 G/DL (ref 12–16)
HGB BLD-MCNC: 16.3 G/DL (ref 13–16)
IMM GRANULOCYTES # BLD AUTO: 0 K/UL (ref 0–0.04)
IMM GRANULOCYTES NFR BLD AUTO: 0 % (ref 0–0.5)
LYMPHOCYTES # BLD: 2.4 K/UL (ref 0.9–3.6)
LYMPHOCYTES # BLD: 2.6 K/UL (ref 1.1–5.9)
LYMPHOCYTES NFR BLD: 39 % (ref 21–52)
LYMPHOCYTES NFR BLD: 41 % (ref 14–44)
MCH RBC QN AUTO: 31.2 PG (ref 24–34)
MCH RBC QN AUTO: 32 PG (ref 25–35)
MCHC RBC AUTO-ENTMCNC: 35.1 G/DL (ref 31–37)
MCHC RBC AUTO-ENTMCNC: 35.4 G/DL (ref 31–37)
MCV RBC AUTO: 88.1 FL (ref 78–100)
MCV RBC AUTO: 91.3 FL (ref 78–102)
MONOCYTES # BLD: 0.5 K/UL (ref 0.05–1.2)
MONOCYTES NFR BLD: 8 % (ref 3–10)
NEUTS SEG # BLD: 3.1 K/UL (ref 1.8–8)
NEUTS SEG # BLD: 3.3 K/UL (ref 1.8–9.5)
NEUTS SEG NFR BLD: 51 % (ref 40–73)
NEUTS SEG NFR BLD: 52 % (ref 40–70)
NRBC # BLD: 0 K/UL (ref 0–0.01)
NRBC BLD-RTO: 0 PER 100 WBC
PLATELET # BLD AUTO: 143 K/UL (ref 140–440)
PLATELET # BLD AUTO: 155 K/UL (ref 135–420)
PMV BLD AUTO: 11.6 FL (ref 9.2–11.8)
RBC # BLD AUTO: 5.06 M/UL (ref 4.1–5.1)
RBC # BLD AUTO: 5.23 M/UL (ref 4.35–5.65)
WBC # BLD AUTO: 6.2 K/UL (ref 4.6–13.2)
WBC # BLD AUTO: 6.4 K/UL (ref 4.5–13)

## 2022-09-13 PROCEDURE — 99211 OFF/OP EST MAY X REQ PHY/QHP: CPT

## 2022-09-13 PROCEDURE — 74011250636 HC RX REV CODE- 250/636: Performed by: INTERNAL MEDICINE

## 2022-09-13 PROCEDURE — 85025 COMPLETE CBC W/AUTO DIFF WBC: CPT

## 2022-09-13 PROCEDURE — 36415 COLL VENOUS BLD VENIPUNCTURE: CPT

## 2022-09-13 PROCEDURE — 99195 PHLEBOTOMY: CPT

## 2022-09-13 RX ORDER — SODIUM CHLORIDE 9 MG/ML
500 INJECTION, SOLUTION INTRAVENOUS ONCE
Status: COMPLETED | OUTPATIENT
Start: 2022-09-13 | End: 2022-09-13

## 2022-09-13 RX ADMIN — SODIUM CHLORIDE 500 ML: 0.9 INJECTION, SOLUTION INTRAVENOUS at 16:00

## 2022-09-13 NOTE — PROGRESS NOTES
[facility] Progress Note    Date: 2022    Name: Karyn Dias    MRN: [id number]         : 1980    Mr. Alvarado Manzo arrived in the [facility] today at [phone], in stable condition, here for CBC--PHLEBOTOMY (EVERY 2 WEEK STATUS). He was assessed and education was provided. Mr. Waqas Crook vitals were reviewed. Visit Vitals  /73 (BP 1 Location: Right upper arm, BP Patient Position: Sitting)   Pulse 63   Temp 97.2 °F (36.2 °C)   Resp 16   SpO2 98%               Blood for the ordered CBC was drawn from his RIGHT AC at 1510, per [facility], and was processed on site. CBC results from today were as follows:    Recent Results (from the past 12 hour(s))   CBC WITH 3 PART DIFF    Collection Time: 22  3:10 PM   Result Value Ref Range    WBC 6.4 4.5 - 13.0 K/uL    RBC 5.06 4.10 - 5.10 M/uL    HGB 16.2 (HH) 12.0 - 16 g/dL    HCT 46.2 36 - 48 %    MCV 91.3 78 - 102 FL    MCH 32.0 25.0 - 35.0 PG    MCHC 35.1 31 - 37 g/dL    RDW 13.0 11.5 - 14.5 %    PLATELET 139 471 - 251 K/uL    NEUTROPHILS 52 40 - 70 %    Mixed cells 7 0.1 - 17 %    LYMPHOCYTES 41 14 - 44 %    ABS. NEUTROPHILS 3.3 1.8 - 9.5 K/UL    ABS. MIXED CELLS 0.5 0.0 - 2.3 K/uL    ABS. LYMPHOCYTES 2.6 1.1 - 5.9 K/UL    DF AUTOMATED         The critically elevated HGB of 16.2 was reported to Nurse Practitioner Maria M Ramos at 0332. No new orders were received. Phlebotomy was indicated today per order, for HCT > 45.0. (Every 2 Week Status Parameters). PIV # 21 G was established in his RIGHT AC at 1530 without incident, and brisk blood return was obtained, and phlebotomy was started. Snack was offered, but was politely declined. Phlebotomy was completed without incident at 1600, after having obtained 500 ml Blood per order. After completion of the phlebotomy,  ml IV Bolus was administered, per order, and also without incident.        After completion of the NS Bolus, his PIV was removed and gauze/coban was applied. Mr. Cecile Denton tolerated well, and voiced no complaints. Mr. Cecile Denton was discharged from [place] [address] in stable condition at 1640. He is to return in 1 week, on next Monday, 9-19-22 at 0900, for his next appointment, for CBC/Phlebotomy (Weekly Status).      Burak Lua RN  September 13, 2022  3:06 PM

## 2022-09-19 ENCOUNTER — HOSPITAL ENCOUNTER (OUTPATIENT)
Dept: INFUSION THERAPY | Age: 42
Discharge: HOME | End: 2022-09-19
Payer: OTHER GOVERNMENT

## 2022-09-19 VITALS
OXYGEN SATURATION: 100 % | TEMPERATURE: 97.1 F | SYSTOLIC BLOOD PRESSURE: 116 MMHG | HEART RATE: 60 BPM | RESPIRATION RATE: 16 BRPM | DIASTOLIC BLOOD PRESSURE: 75 MMHG

## 2022-09-19 LAB
BASO+EOS+MONOS # BLD AUTO: 0.5 K/UL (ref 0–2.3)
BASO+EOS+MONOS NFR BLD AUTO: 7 % (ref 0.1–17)
DIFFERENTIAL METHOD BLD: NORMAL
ERYTHROCYTE [DISTWIDTH] IN BLOOD BY AUTOMATED COUNT: 13 % (ref 11.5–14.5)
HCT VFR BLD AUTO: 43.9 % (ref 36–48)
HGB BLD-MCNC: 15.5 G/DL (ref 12–16)
LYMPHOCYTES # BLD: 3 K/UL (ref 1.1–5.9)
LYMPHOCYTES NFR BLD: 42 % (ref 14–44)
MCH RBC QN AUTO: 32 PG (ref 25–35)
MCHC RBC AUTO-ENTMCNC: 35.3 G/DL (ref 31–37)
MCV RBC AUTO: 90.5 FL (ref 78–102)
NEUTS SEG # BLD: 3.6 K/UL (ref 1.8–9.5)
NEUTS SEG NFR BLD: 51 % (ref 40–70)
PLATELET # BLD AUTO: 145 K/UL (ref 140–440)
RBC # BLD AUTO: 4.85 M/UL (ref 4.1–5.1)
WBC # BLD AUTO: 7.1 K/UL (ref 4.5–13)

## 2022-09-19 PROCEDURE — 85025 COMPLETE CBC W/AUTO DIFF WBC: CPT

## 2022-09-19 PROCEDURE — 36415 COLL VENOUS BLD VENIPUNCTURE: CPT

## 2022-10-06 ENCOUNTER — HOSPITAL ENCOUNTER (OUTPATIENT)
Dept: INFUSION THERAPY | Age: 42
Discharge: HOME | End: 2022-10-06
Payer: OTHER GOVERNMENT

## 2022-10-06 VITALS
HEART RATE: 98 BPM | TEMPERATURE: 97.8 F | SYSTOLIC BLOOD PRESSURE: 118 MMHG | OXYGEN SATURATION: 100 % | DIASTOLIC BLOOD PRESSURE: 80 MMHG | RESPIRATION RATE: 16 BRPM

## 2022-10-06 LAB
BASO+EOS+MONOS # BLD AUTO: 0.6 K/UL (ref 0–2.3)
BASO+EOS+MONOS NFR BLD AUTO: 8 % (ref 0.1–17)
DIFFERENTIAL METHOD BLD: NORMAL
ERYTHROCYTE [DISTWIDTH] IN BLOOD BY AUTOMATED COUNT: 13 % (ref 11.5–14.5)
HCT VFR BLD AUTO: 44.3 % (ref 36–48)
HGB BLD-MCNC: 15.5 G/DL (ref 12–16)
LYMPHOCYTES # BLD: 3 K/UL (ref 1.1–5.9)
LYMPHOCYTES NFR BLD: 37 % (ref 14–44)
MCH RBC QN AUTO: 31.9 PG (ref 25–35)
MCHC RBC AUTO-ENTMCNC: 35 G/DL (ref 31–37)
MCV RBC AUTO: 91.2 FL (ref 78–102)
NEUTS SEG # BLD: 4.5 K/UL (ref 1.8–9.5)
NEUTS SEG NFR BLD: 55 % (ref 40–70)
PLATELET # BLD AUTO: 142 K/UL (ref 140–440)
RBC # BLD AUTO: 4.86 M/UL (ref 4.1–5.1)
WBC # BLD AUTO: 8.1 K/UL (ref 4.5–13)

## 2022-10-06 PROCEDURE — 85025 COMPLETE CBC W/AUTO DIFF WBC: CPT

## 2022-10-06 PROCEDURE — 36415 COLL VENOUS BLD VENIPUNCTURE: CPT

## 2022-10-20 ENCOUNTER — HOSPITAL ENCOUNTER (OUTPATIENT)
Dept: INFUSION THERAPY | Age: 42
Discharge: HOME | End: 2022-10-20
Payer: OTHER GOVERNMENT

## 2022-10-20 VITALS
OXYGEN SATURATION: 98 % | DIASTOLIC BLOOD PRESSURE: 73 MMHG | TEMPERATURE: 98.8 F | HEART RATE: 78 BPM | SYSTOLIC BLOOD PRESSURE: 121 MMHG | RESPIRATION RATE: 16 BRPM

## 2022-10-20 LAB
BASO+EOS+MONOS # BLD AUTO: 0.5 K/UL (ref 0–2.3)
BASO+EOS+MONOS NFR BLD AUTO: 6 % (ref 0.1–17)
DIFFERENTIAL METHOD BLD: ABNORMAL
ERYTHROCYTE [DISTWIDTH] IN BLOOD BY AUTOMATED COUNT: 12.3 % (ref 11.5–14.5)
HCT VFR BLD AUTO: 46.7 % (ref 36–48)
HGB BLD-MCNC: 16.5 G/DL (ref 12–16)
LYMPHOCYTES # BLD: 3 K/UL (ref 1.1–5.9)
LYMPHOCYTES NFR BLD: 35 % (ref 14–44)
MCH RBC QN AUTO: 31.6 PG (ref 25–35)
MCHC RBC AUTO-ENTMCNC: 35.3 G/DL (ref 31–37)
MCV RBC AUTO: 89.5 FL (ref 78–102)
NEUTS SEG # BLD: 4.9 K/UL (ref 1.8–9.5)
NEUTS SEG NFR BLD: 59 % (ref 40–70)
PLATELET # BLD AUTO: 133 K/UL (ref 140–440)
RBC # BLD AUTO: 5.22 M/UL (ref 4.1–5.1)
WBC # BLD AUTO: 8.4 K/UL (ref 4.5–13)

## 2022-10-20 PROCEDURE — 36415 COLL VENOUS BLD VENIPUNCTURE: CPT

## 2022-10-20 PROCEDURE — 85025 COMPLETE CBC W/AUTO DIFF WBC: CPT

## 2022-10-20 PROCEDURE — 74011250636 HC RX REV CODE- 250/636: Performed by: INTERNAL MEDICINE

## 2022-10-20 PROCEDURE — 99195 PHLEBOTOMY: CPT

## 2022-10-20 RX ORDER — SODIUM CHLORIDE 9 MG/ML
500 INJECTION, SOLUTION INTRAVENOUS ONCE
Status: COMPLETED | OUTPATIENT
Start: 2022-10-20 | End: 2022-10-20

## 2022-10-20 RX ORDER — SODIUM CHLORIDE 9 MG/ML
500 INJECTION, SOLUTION INTRAVENOUS ONCE
Status: DISCONTINUED | OUTPATIENT
Start: 2022-10-20 | End: 2022-10-20

## 2022-10-20 RX ADMIN — SODIUM CHLORIDE 500 ML: 0.9 INJECTION, SOLUTION INTRAVENOUS at 14:45

## 2022-10-20 NOTE — PROGRESS NOTES
[facility] Progress Note    Date: 2022    Name: Glenys Sims    MRN: [id number]         : 1980    Mr. Elizabeth Roe arrived in the [facility] today at [phone], in stable condition, here for CBC--PHLEBOTOMY (EVERY 2 WEEK STATUS). He was assessed and education was provided. Mr. Monroe Poole vitals were reviewed. Visit Vitals  /83 (BP 1 Location: Right upper arm, BP Patient Position: Sitting)   Pulse 76   Temp 98.8 °F (37.1 °C)   Resp 16   SpO2 98%               Blood for the ordered CBC was drawn from his RIGHT AC at 1412, per [facility], and was processed on site. CBC results from today were as follows:    Recent Results (from the past 12 hour(s))   CBC WITH 3 PART DIFF    Collection Time: 10/20/22  2:12 PM   Result Value Ref Range    WBC 8.4 4.5 - 13.0 K/uL    RBC 5.22 (H) 4.10 - 5.10 M/uL    HGB 16.5 (HH) 12.0 - 16 g/dL    HCT 46.7 36 - 48 %    MCV 89.5 78 - 102 FL    MCH 31.6 25.0 - 35.0 PG    MCHC 35.3 31 - 37 g/dL    RDW 12.3 11.5 - 14.5 %    PLATELET 816 (L) 513 - 440 K/uL    NEUTROPHILS 59 40 - 70 %    Mixed cells 6 0.1 - 17 %    LYMPHOCYTES 35 14 - 44 %    ABS. NEUTROPHILS 4.9 1.8 - 9.5 K/UL    ABS. MIXED CELLS 0.5 0.0 - 2.3 K/uL    ABS. LYMPHOCYTES 3.0 1.1 - 5.9 K/UL    DF AUTOMATED         The critically elevated HGB of 16.5 was reported to Dr. Skyler Lowry at [phone]. No new orders were received. Phlebotomy was indicated today per order, for HCT > 45.0. (Every 2 Week Status Parameters). PIV # 21 G was established in his RIGHT AC at 1425 without incident, and brisk blood return was obtained, and phlebotomy was started. Snack was offered and provided. Phlebotomy was completed without incident at 1445 after having obtained 500 ml Blood per order. After completion of the phlebotomy,  ml IV Bolus was administered, per order, and also without incident. After completion of the NS Bolus, his PIV was removed and gauze/coban was applied.        Mr. Elizabeth Roe tolerated well, and voiced no complaints. Mr. Josh Ward was discharged from [place] [address] in stable condition at 1525. He is to return in 1 week, on next Friday, 10-28-22 at 0900, for his next appointment, for CBC/Phlebotomy (Weekly Status).      Cristal Garcia RN  October 20, 2022  2:05 PM

## 2022-10-27 ENCOUNTER — APPOINTMENT (OUTPATIENT)
Dept: INFUSION THERAPY | Age: 42
End: 2022-10-27
Payer: OTHER GOVERNMENT

## 2022-10-28 ENCOUNTER — HOSPITAL ENCOUNTER (OUTPATIENT)
Dept: INFUSION THERAPY | Age: 42
Discharge: HOME | End: 2022-10-28
Payer: OTHER GOVERNMENT

## 2022-10-28 VITALS
OXYGEN SATURATION: 100 % | DIASTOLIC BLOOD PRESSURE: 84 MMHG | SYSTOLIC BLOOD PRESSURE: 145 MMHG | TEMPERATURE: 98 F | HEART RATE: 68 BPM | RESPIRATION RATE: 16 BRPM

## 2022-10-28 LAB
BASO+EOS+MONOS # BLD AUTO: 0.6 K/UL (ref 0–2.3)
BASO+EOS+MONOS NFR BLD AUTO: 7 % (ref 0.1–17)
DIFFERENTIAL METHOD BLD: NORMAL
ERYTHROCYTE [DISTWIDTH] IN BLOOD BY AUTOMATED COUNT: 12.8 % (ref 11.5–14.5)
HCT VFR BLD AUTO: 43.3 % (ref 36–48)
HGB BLD-MCNC: 15.1 G/DL (ref 12–16)
LYMPHOCYTES # BLD: 2.8 K/UL (ref 1.1–5.9)
LYMPHOCYTES NFR BLD: 37 % (ref 14–44)
MCH RBC QN AUTO: 31.5 PG (ref 25–35)
MCHC RBC AUTO-ENTMCNC: 34.9 G/DL (ref 31–37)
MCV RBC AUTO: 90.4 FL (ref 78–102)
NEUTS SEG # BLD: 4.2 K/UL (ref 1.8–9.5)
NEUTS SEG NFR BLD: 55 % (ref 40–70)
PLATELET # BLD AUTO: 144 K/UL (ref 140–440)
RBC # BLD AUTO: 4.79 M/UL (ref 4.1–5.1)
WBC # BLD AUTO: 7.6 K/UL (ref 4.5–13)

## 2022-10-28 PROCEDURE — 85025 COMPLETE CBC W/AUTO DIFF WBC: CPT

## 2022-10-28 PROCEDURE — 36415 COLL VENOUS BLD VENIPUNCTURE: CPT

## 2022-10-28 NOTE — PROGRESS NOTES
[facility] Lab Visit:    Christian Genao  1980  [id number]    4112 Pt arrived ambulatory w/o assist. Labs drawn per order via Left AC venipuncture x1 attempt. Pt tolerated well without complaints. Gauze/ coban to site. Pt departed [facility] ambulatory and in no distress at 0925.     Visit Vitals  BP (!) 145/84 (BP 1 Location: Left upper arm, BP Patient Position: Sitting)   Pulse 68   Temp 98 °F (36.7 °C)   Resp 16   SpO2 100%

## 2022-11-10 ENCOUNTER — APPOINTMENT (OUTPATIENT)
Dept: INFUSION THERAPY | Age: 42
End: 2022-11-10
Payer: OTHER GOVERNMENT

## 2022-11-14 ENCOUNTER — HOSPITAL ENCOUNTER (OUTPATIENT)
Dept: INFUSION THERAPY | Age: 42
Discharge: HOME | End: 2022-11-14
Payer: OTHER GOVERNMENT

## 2022-11-14 VITALS
SYSTOLIC BLOOD PRESSURE: 130 MMHG | RESPIRATION RATE: 16 BRPM | TEMPERATURE: 98.1 F | OXYGEN SATURATION: 100 % | DIASTOLIC BLOOD PRESSURE: 82 MMHG | HEART RATE: 69 BPM

## 2022-11-14 DIAGNOSIS — D75.1 POLYCYTHEMIA: ICD-10-CM

## 2022-11-14 DIAGNOSIS — R79.89 ELEVATED FERRITIN: ICD-10-CM

## 2022-11-14 DIAGNOSIS — D69.6 THROMBOCYTOPENIA (HCC): ICD-10-CM

## 2022-11-14 LAB
ALBUMIN SERPL-MCNC: 3.9 G/DL (ref 3.4–5)
ALBUMIN/GLOB SERPL: 1.4 {RATIO} (ref 0.8–1.7)
ALP SERPL-CCNC: 63 U/L (ref 45–117)
ALT SERPL-CCNC: 32 U/L (ref 16–61)
ANION GAP SERPL CALC-SCNC: 5 MMOL/L (ref 3–18)
AST SERPL-CCNC: 20 U/L (ref 10–38)
BASO+EOS+MONOS # BLD AUTO: 0.6 K/UL (ref 0–2.3)
BASO+EOS+MONOS NFR BLD AUTO: 7 % (ref 0.1–17)
BILIRUB SERPL-MCNC: 0.7 MG/DL (ref 0.2–1)
BUN SERPL-MCNC: 11 MG/DL (ref 7–18)
BUN/CREAT SERPL: 9 (ref 12–20)
CALCIUM SERPL-MCNC: 8.6 MG/DL (ref 8.5–10.1)
CHLORIDE SERPL-SCNC: 110 MMOL/L (ref 100–111)
CO2 SERPL-SCNC: 26 MMOL/L (ref 21–32)
CREAT SERPL-MCNC: 1.24 MG/DL (ref 0.6–1.3)
DIFFERENTIAL METHOD BLD: ABNORMAL
ERYTHROCYTE [DISTWIDTH] IN BLOOD BY AUTOMATED COUNT: 12.5 % (ref 11.5–14.5)
FERRITIN SERPL-MCNC: 28 NG/ML (ref 8–388)
GLOBULIN SER CALC-MCNC: 2.8 G/DL (ref 2–4)
GLUCOSE SERPL-MCNC: 105 MG/DL (ref 74–99)
HCT VFR BLD AUTO: 44.4 % (ref 36–48)
HGB BLD-MCNC: 15.7 G/DL (ref 12–16)
IRON SATN MFR SERPL: 35 % (ref 20–50)
IRON SERPL-MCNC: 121 UG/DL (ref 50–175)
LYMPHOCYTES # BLD: 2.1 K/UL (ref 1.1–5.9)
LYMPHOCYTES NFR BLD: 25 % (ref 14–44)
MCH RBC QN AUTO: 31.2 PG (ref 25–35)
MCHC RBC AUTO-ENTMCNC: 35.4 G/DL (ref 31–37)
MCV RBC AUTO: 88.3 FL (ref 78–102)
NEUTS SEG # BLD: 6 K/UL (ref 1.8–9.5)
NEUTS SEG NFR BLD: 68 % (ref 40–70)
PLATELET # BLD AUTO: 138 K/UL (ref 140–440)
POTASSIUM SERPL-SCNC: 4 MMOL/L (ref 3.5–5.5)
PROT SERPL-MCNC: 6.7 G/DL (ref 6.4–8.2)
RBC # BLD AUTO: 5.03 M/UL (ref 4.1–5.1)
SODIUM SERPL-SCNC: 141 MMOL/L (ref 136–145)
TIBC SERPL-MCNC: 349 UG/DL (ref 250–450)
WBC # BLD AUTO: 8.7 K/UL (ref 4.5–13)

## 2022-11-14 PROCEDURE — 85025 COMPLETE CBC W/AUTO DIFF WBC: CPT

## 2022-11-14 PROCEDURE — 80053 COMPREHEN METABOLIC PANEL: CPT

## 2022-11-14 PROCEDURE — 36415 COLL VENOUS BLD VENIPUNCTURE: CPT

## 2022-11-14 PROCEDURE — 82728 ASSAY OF FERRITIN: CPT

## 2022-11-14 PROCEDURE — 83540 ASSAY OF IRON: CPT

## 2022-11-14 NOTE — PROGRESS NOTES
[facility] Lab Visit:    Jerri Joseph  1980  [id number]    6816 Pt arrived ambulatory w/o assist. Labs drawn per order via Left AC venipuncture x1 attempt. Pt tolerated well without complaints. Gauze/ coban to site. Pt departed \[facility]\"" ambulatory and in no distress at 1530.      Visit Vitals  /82 (BP 1 Location: Left upper arm, BP Patient Position: Sitting)   Pulse 69   Temp 98.1 °F (36.7 °C)   Resp 16   SpO2 100%

## 2022-12-01 ENCOUNTER — OFFICE VISIT (OUTPATIENT)
Dept: ONCOLOGY | Age: 42
End: 2022-12-01
Payer: OTHER GOVERNMENT

## 2022-12-01 ENCOUNTER — HOSPITAL ENCOUNTER (OUTPATIENT)
Dept: INFUSION THERAPY | Age: 42
End: 2022-12-01
Payer: OTHER GOVERNMENT

## 2022-12-01 VITALS
HEART RATE: 68 BPM | WEIGHT: 226.4 LBS | HEIGHT: 71 IN | RESPIRATION RATE: 16 BRPM | BODY MASS INDEX: 31.69 KG/M2 | SYSTOLIC BLOOD PRESSURE: 125 MMHG | DIASTOLIC BLOOD PRESSURE: 86 MMHG | OXYGEN SATURATION: 97 %

## 2022-12-01 VITALS
TEMPERATURE: 97 F | RESPIRATION RATE: 16 BRPM | SYSTOLIC BLOOD PRESSURE: 135 MMHG | OXYGEN SATURATION: 96 % | HEART RATE: 75 BPM | DIASTOLIC BLOOD PRESSURE: 70 MMHG

## 2022-12-01 DIAGNOSIS — D75.1 POLYCYTHEMIA: Primary | ICD-10-CM

## 2022-12-01 DIAGNOSIS — R79.89 ELEVATED FERRITIN: ICD-10-CM

## 2022-12-01 LAB
BASO+EOS+MONOS # BLD AUTO: 0.5 K/UL (ref 0–2.3)
BASO+EOS+MONOS NFR BLD AUTO: 7 % (ref 0.1–17)
DIFFERENTIAL METHOD BLD: ABNORMAL
ERYTHROCYTE [DISTWIDTH] IN BLOOD BY AUTOMATED COUNT: 12.7 % (ref 11.5–14.5)
HCT VFR BLD AUTO: 47.8 % (ref 36–48)
HGB BLD-MCNC: 16.6 G/DL (ref 12–16)
LYMPHOCYTES # BLD: 2.8 K/UL (ref 1.1–5.9)
LYMPHOCYTES NFR BLD: 36 % (ref 14–44)
MCH RBC QN AUTO: 30.6 PG (ref 25–35)
MCHC RBC AUTO-ENTMCNC: 34.7 G/DL (ref 31–37)
MCV RBC AUTO: 88.2 FL (ref 78–102)
NEUTS SEG # BLD: 4.5 K/UL (ref 1.8–9.5)
NEUTS SEG NFR BLD: 58 % (ref 40–70)
PLATELET # BLD AUTO: 149 K/UL (ref 140–440)
RBC # BLD AUTO: 5.42 M/UL (ref 4.1–5.1)
WBC # BLD AUTO: 7.8 K/UL (ref 4.5–13)

## 2022-12-01 PROCEDURE — 74011250636 HC RX REV CODE- 250/636: Performed by: INTERNAL MEDICINE

## 2022-12-01 PROCEDURE — 85025 COMPLETE CBC W/AUTO DIFF WBC: CPT

## 2022-12-01 PROCEDURE — 99195 PHLEBOTOMY: CPT

## 2022-12-01 PROCEDURE — 36415 COLL VENOUS BLD VENIPUNCTURE: CPT

## 2022-12-01 RX ORDER — SODIUM CHLORIDE 9 MG/ML
999 INJECTION, SOLUTION INTRAVENOUS CONTINUOUS
Status: DISCONTINUED | OUTPATIENT
Start: 2022-12-01 | End: 2022-12-02 | Stop reason: HOSPADM

## 2022-12-01 RX ADMIN — SODIUM CHLORIDE 999 ML/HR: 9 INJECTION, SOLUTION INTRAVENOUS at 14:00

## 2022-12-01 NOTE — PROGRESS NOTES
[facility] Progress Note                   Date: 2022    Name: Donna Hernández    MRN: [id number]    : 1980    Therapeutic Phlebotomy Q2 weeks     Mr. Claudean Plenty to [facility] ambulatory at 1340 accompanied by self. Pt was accessed and education was provided. Pt denies any complaints or concerns. Visit Vitals  /79 (BP 1 Location: Left upper arm, BP Patient Position: Sitting)   Pulse 70   Temp 97.3 °F (36.3 °C)   Resp 16   SpO2 96%       CBC drawn from [facility] by Monique Phlebotomist and CBC ran in house:     Recent Results (from the past 24 hour(s))   CBC WITH 3 PART DIFF    Collection Time: 22 11:50 AM   Result Value Ref Range    WBC 7.8 4.5 - 13.0 K/uL    RBC 5.42 (H) 4.10 - 5.10 M/uL    HGB 16.6 (HH) 12.0 - 16 g/dL    HCT 47.8 36 - 48 %    MCV 88.2 78 - 102 FL    MCH 30.6 25.0 - 35.0 PG    MCHC 34.7 31 - 37 g/dL    RDW 12.7 11.5 - 14.5 %    PLATELET 082 806 - 137 K/uL    NEUTROPHILS 58 40 - 70 %    Mixed cells 7 0.1 - 17 %    LYMPHOCYTES 36 14 - 44 %    ABS. NEUTROPHILS 4.5 1.8 - 9.5 K/UL    ABS. MIXED CELLS 0.5 0.0 - 2.3 K/uL    ABS. LYMPHOCYTES 2.8 1.1 - 5.9 K/UL    DF AUTOMATED       Phlebotomy indicated for HCT >45 per order. 20ga PIV started to RT [facility] [place] x1 attempt. Phlebotomy started at 1345. Pt politely declined water or snack. Pt states he ate lunch prior to his appointment. Phlebotomy ended at 1400. Total of approximately 500cc of blood withdrawn followed by NS bolus 500cc per order. Pt tolerated well. VSS. Pt tolerated well with no complaints or concerns. VSS. Patient armband removed and shredded. I have reviewed discharge instructions with the patient. The patient verbalized understanding. Mr. Claudean Plenty was discharged from [address] in stable condition at 1440. He is to return on 22 at 1500 for his next phlebotomy (weekly status) appointment.     Nereyda Cervantes RN  2022  4:09 PM

## 2022-12-01 NOTE — PROGRESS NOTES
Hematology/Oncology  Progress Note    Name: Nael Barrios  Date: 2022  : 1980    Soniya Becerra MD     Mr. Gilbert Lott is a 40y.o. year old male who was seen for Polycythemia . Subjective:   Mr. Gilbert Lott is 40y.o. year old male who was seen for management of Thrombocytopenia, Polycythemia, and elevated ferritin. The patient has a past medical history significant of Thrombocytopenia, Polycythemia, and elevated ferritin. He was seen by Dr. Monique Anthony hematology/oncology [facility] on 2018 in [place]. 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 Per chart review from his previous hematologist, 2018 CBC reviewed low platelet count of 364,359. His platelet was low since at least 2003, 138,000 per chart review. Previous work-up revealed hepatitis panel/HIV/antiplatelet [id number]/F00/UGKTTX/VDWLOZS electrophoresis unremarkable, normal platelet clumping. He was suggested to continue follow-up with CBC every 6 months. He has also history of elevated ferritin level, homozygous H63D mutation positive, low likelihood of clinical hemochromatosis. He was placed on therapeutic phlebotomy every 3 to 4 months due to patient continuing systemic complaints with goal of maintaining ferritin in 100 -150 or less range. He has seen GI who has performed liver biopsy in 12/3/2019, hepatic iron index reported 1.1, mild iron accumulation, no fibrosis or inflammation reportedly. He has history of polycythemia likely secondary process with normal EPO level, negative JAK2/CALR/MPL/JAK2 V617K mutations, possible underlying sleep apnea. His last phlebotmy was May 2021  The patient report chronic fatigue and chronic body aches. Denied fever, chills, night sweat, unintentional weight loss, skin lumps or bumps, acute bleeding or bruising issues.  Denied headache, acute vision change, dizziness, chest pain, shortness of breath, palpitation, productive cough, nausea, vomiting, abdominal pain, altered bowel habits, dysuria, worsen bone pain or back pain, new focal numbness or weakness. Past medical history, family history, and social history: these were reviewed and remains unchanged. Past Medical History:   Diagnosis Date    Thrombocytopenia [facility])      Past Surgical History:   Procedure Laterality Date    HX ORTHOPAEDIC      left shoulder 2004/2005    IR BX BONE MARROW DIAGNOSTIC  7/27/2022     Social History     Socioeconomic History    Marital status:      Spouse name: Not on file    Number of children: Not on file    Years of education: Not on file    Highest education level: Not on file   Occupational History    Not on file   Tobacco Use    Smoking status: Never    Smokeless tobacco: Never   Vaping Use    Vaping Use: Never used   Substance and Sexual Activity    Alcohol use: Yes    Drug use: Never    Sexual activity: Not on file   Other Topics Concern    Not on file   Social History Narrative    Not on file     Social Determinants of Health     Financial Resource Strain: Not on file   Food Insecurity: Not on file   Transportation Needs: Not on file   Physical Activity: Not on file   Stress: Not on file   Social Connections: Not on file   Intimate Partner Violence: Not on file   Housing Stability: Not on file     No family history on file. Current Outpatient Medications   Medication Sig Dispense Refill    cholecalciferol (VITAMIN D3) (2,000 UNITS /50 MCG) cap capsule Take  by mouth daily. meloxicam (MOBIC) 15 mg tablet Take 15 mg by mouth daily. omega 3-dha-epa-fish oil (Fish OiL) 100-160-1,000 mg cap Fish Oil   Take 1 BID      ibuprofen (MOTRIN) 800 mg tablet       HYDROcodone-acetaminophen (NORCO) 7.5-325 mg per tablet hydrocodone 7.5 mg-acetaminophen 325 mg tablet   TK ONE T PO Q 4 HOURS PRN P (Patient not taking: Reported on 11/9/2021)      fexofenadine HCl (ALLEGRA PO) Take  by mouth as needed. cyanocobalamin, vitamin B-12, 1,000 mcg cap cyanocobalamin (vitamin B-12) 1,000 mcg capsule   Take 1 capsule every day by oral route. (Patient not taking: Reported on 8/30/2022)         Review of Systems   Constitutional:  Positive for malaise/fatigue. Negative for chills, diaphoresis, fever and weight loss. HENT: Negative. Eyes: Negative. Respiratory: Negative. Negative for cough, hemoptysis, shortness of breath and wheezing. Cardiovascular: Negative. Negative for chest pain, palpitations and leg swelling. Gastrointestinal: Negative. Negative for abdominal pain, diarrhea, heartburn, nausea and vomiting. Genitourinary: Negative. Negative for dysuria, frequency, hematuria and urgency. Musculoskeletal:  Positive for myalgias. Negative for joint pain. Skin: Negative. Negative for itching and rash. Neurological: Negative. Negative for dizziness, seizures, weakness and headaches. Endo/Heme/Allergies: Negative. Psychiatric/Behavioral: Negative. Negative for depression. The patient does not have insomnia. Objective: There were no vitals taken for this visit. ECOG PS 0  Physical Exam  Pulmonary:      Breath sounds: Normal breath sounds. Musculoskeletal:         General: Normal range of motion. Skin:     General: Skin is warm. Neurological:      Mental Status: He is alert and oriented to person, place, and time. Psychiatric:         Mood and Affect: Mood normal.         Behavior: Behavior normal.        Lab data:      Results for orders placed or performed during the hospital encounter of 11/14/22   CBC WITH 3 PART DIFF     Status: Abnormal   Result Value Ref Range Status    WBC 8.7 4.5 - 13.0 K/uL Final    RBC 5.03 4. 10 - 5.10 M/uL Final    HGB 15.7 12.0 - 16 g/dL Final    HCT 44.4 36 - 48 % Final    MCV 88.3 78 - 102 FL Final    MCH 31.2 25.0 - 35.0 PG Final    MCHC 35.4 31 - 37 g/dL Final    RDW 12.5 11.5 - 14.5 % Final    PLATELET 990 (L) 970 - 440 K/uL Final NEUTROPHILS 68 40 - 70 % Final    Mixed cells 7 0.1 - 17 % Final    LYMPHOCYTES 25 14 - 44 % Final    ABS. NEUTROPHILS 6.0 1.8 - 9.5 K/UL Final    ABS. MIXED CELLS 0.6 0.0 - 2.3 K/uL Final    ABS. LYMPHOCYTES 2.1 1.1 - 5.9 K/UL Final     Comment: Test performed at [address] [place] [address] or Outpatient Infusion Center Location. Reviewed by Medical Director. DF AUTOMATED   Final           Assessment:     1. Polycythemia    2. Elevated ferritin          Plan:   # Polycythemia  # Elevated ferritin; homozygous H63D mutation positive. -- The patient has a past medical history significant of Thrombocytopenia, Polycythemia, and elevated ferritin. -- He was seen by Dr. Ton Avalos hematology/oncology [facility]) on 8/20/2018 in [place]. 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 Per chart review from his previous hematologist, 7/2018 CBC reviewed low platelet count of 437,375. His platelet was low since at least 09/2003, 138,000 per chart review. Previous work-up revealed hepatitis panel/HIV/antiplatelet NMOSSTYQ/Z18/STQUPD/HBUUTMJ electrophoresis unremarkable, nrmal platelet clumping. He was suggested to continue follow-up with CBC every 6 months. -- He has also history of elevated ferritin level, homozygous H63D mutation positive, low likelihood of clinical hemochromatosis. He was placed on therapeutic phlebotomy every 3 to 4 months due to patient continuing systemic complaints with goal of maintaining ferritin in range of 100 -150 or less . -- He has seen GI who has performed liver biopsy in 12/3/2019, hepatic iron index reported 1.1, mild iron accumulation, no fibrosis or inflammation reportedly. -- Further workup revealed normal EPO level, negative JAK2/CALR/MPL/JAK2 V617K mutations. The patient doesn't think he has underlying sleep apnea.   -- His last phlebotmy was about 2/23/2022 .  -- 6/6/2022 H/H 16.4/48.5, WBC 7.2, PLTs 156K  -- 7/27/2022 bone marrow biopsy reported no morphologic evidence of bone marrow involvement by a myeloid neoplasm. -- His persistent polycythemia was still unclear etiology. -- Clinical stable. Recent labs reviewed with the patient today. 12/1/2022 H/H 16.6/47.8    Plan  -- He will continue Therapeutic phlebotomy if significant elevated H/H  -- Continue Lab check CBC, Iron profile, ferritin  ---We will see the patient back in about 4 months. Always sooner if required. Hx Thrombocytopenia  -- 11/14/2022  CBC reported hemoglobin 15.7, hematocrit 44.4%, WBC 8.7, platelet 528,460.  -- CTM CBC     No orders of the defined types were placed in this encounter. Reena De Leon MD  12/1/2022      Please note: This document has been produced using voice recognition software. Unrecognized errors in transcription may be present.       CC: Heraclio Middleton MD

## 2022-12-12 ENCOUNTER — HOSPITAL ENCOUNTER (OUTPATIENT)
Dept: INFUSION THERAPY | Age: 42
Discharge: HOME | End: 2022-12-12
Payer: OTHER GOVERNMENT

## 2022-12-12 VITALS
TEMPERATURE: 97.3 F | HEART RATE: 81 BPM | OXYGEN SATURATION: 100 % | DIASTOLIC BLOOD PRESSURE: 68 MMHG | RESPIRATION RATE: 18 BRPM | SYSTOLIC BLOOD PRESSURE: 110 MMHG

## 2022-12-12 LAB
BASO+EOS+MONOS # BLD AUTO: 0.5 K/UL (ref 0–2.3)
BASO+EOS+MONOS NFR BLD AUTO: 7 % (ref 0.1–17)
DIFFERENTIAL METHOD BLD: ABNORMAL
ERYTHROCYTE [DISTWIDTH] IN BLOOD BY AUTOMATED COUNT: 12.9 % (ref 11.5–14.5)
HCT VFR BLD AUTO: 47.2 % (ref 36–48)
HGB BLD-MCNC: 16.4 G/DL (ref 12–16)
LYMPHOCYTES # BLD: 2.9 K/UL (ref 1.1–5.9)
LYMPHOCYTES NFR BLD: 44 % (ref 14–44)
MCH RBC QN AUTO: 30.8 PG (ref 25–35)
MCHC RBC AUTO-ENTMCNC: 34.7 G/DL (ref 31–37)
MCV RBC AUTO: 88.7 FL (ref 78–102)
NEUTS SEG # BLD: 3.2 K/UL (ref 1.8–9.5)
NEUTS SEG NFR BLD: 49 % (ref 40–70)
PLATELET # BLD AUTO: 164 K/UL (ref 140–440)
RBC # BLD AUTO: 5.32 M/UL (ref 4.1–5.1)
WBC # BLD AUTO: 6.6 K/UL (ref 4.5–13)

## 2022-12-12 PROCEDURE — 85025 COMPLETE CBC W/AUTO DIFF WBC: CPT

## 2022-12-12 PROCEDURE — 74011250636 HC RX REV CODE- 250/636: Performed by: INTERNAL MEDICINE

## 2022-12-12 PROCEDURE — 74011000250 HC RX REV CODE- 250: Performed by: INTERNAL MEDICINE

## 2022-12-12 PROCEDURE — 36415 COLL VENOUS BLD VENIPUNCTURE: CPT

## 2022-12-12 PROCEDURE — 99195 PHLEBOTOMY: CPT

## 2022-12-12 RX ORDER — SODIUM CHLORIDE 0.9 % (FLUSH) 0.9 %
10-40 SYRINGE (ML) INJECTION AS NEEDED
Status: DISCONTINUED | OUTPATIENT
Start: 2022-12-12 | End: 2022-12-16 | Stop reason: HOSPADM

## 2022-12-12 RX ORDER — SODIUM CHLORIDE 9 MG/ML
500 INJECTION, SOLUTION INTRAVENOUS CONTINUOUS
Status: DISCONTINUED | OUTPATIENT
Start: 2022-12-12 | End: 2022-12-13 | Stop reason: HOSPADM

## 2022-12-12 RX ADMIN — SODIUM CHLORIDE 500 ML: 0.9 INJECTION, SOLUTION INTRAVENOUS at 16:07

## 2022-12-12 RX ADMIN — Medication 10 ML: at 16:38

## 2022-12-12 NOTE — PROGRESS NOTES
[facility] Progress Note                   Date: 2022    Name: Cody Sauer    MRN: [id number]    : 1980    Therapeutic Phlebotomy Q2 weeks     Mr. Judi Todd to [facility] ambulatory at [phone] accompanied by self. Pt was accessed and education was provided. Pt denies any complaints or concerns. Visit Vitals  /85 (BP 1 Location: Left upper arm, BP Patient Position: Sitting)   Pulse 66   Resp 18   SpO2 100%       CBC drawn from [facility] by Monique Phlebotomist and CBC ran in house:     Recent Results (from the past 24 hour(s))   CBC WITH 3 PART DIFF    Collection Time: 22  3:15 PM   Result Value Ref Range    WBC 6.6 4.5 - 13.0 K/uL    RBC 5.32 (H) 4.10 - 5.10 M/uL    HGB 16.4 (HH) 12.0 - 16 g/dL    HCT 47.2 36 - 48 %    MCV 88.7 78 - 102 FL    MCH 30.8 25.0 - 35.0 PG    MCHC 34.7 31 - 37 g/dL    RDW 12.9 11.5 - 14.5 %    PLATELET 543 803 - 621 K/uL    NEUTROPHILS 49 40 - 70 %    Mixed cells 7 0.1 - 17 %    LYMPHOCYTES 44 14 - 44 %    ABS. NEUTROPHILS 3.2 1.8 - 9.5 K/UL    ABS. MIXED CELLS 0.5 0.0 - 2.3 K/uL    ABS. LYMPHOCYTES 2.9 1.1 - 5.9 K/UL    DF AUTOMATED       Phlebotomy indicated for HCT >45 per order. HgB: 16.4 and Hct 47.2.    18 G PIV started to RT [facility] x1 attempt. Phlebotomy started at 1530. Phlebotomy ended at 1608. Total of approximately 500cc of blood withdrawn followed by NS bolus 500cc per order. Pt tolerated well. VSS. Pt tolerated well with no complaints or concerns. VSS. Patient armband removed and shredded. I have reviewed discharge instructions with the patient. The patient verbalized understanding. Mr. Judi Todd was discharged from [place] [address] in stable condition at .[phone].     Kali Begum RN  2022

## 2022-12-20 ENCOUNTER — HOSPITAL ENCOUNTER (OUTPATIENT)
Dept: INFUSION THERAPY | Age: 42
Discharge: HOME | End: 2022-12-20
Payer: OTHER GOVERNMENT

## 2022-12-20 VITALS
RESPIRATION RATE: 18 BRPM | HEART RATE: 68 BPM | OXYGEN SATURATION: 100 % | SYSTOLIC BLOOD PRESSURE: 123 MMHG | DIASTOLIC BLOOD PRESSURE: 80 MMHG | TEMPERATURE: 97.8 F

## 2022-12-20 LAB
BASO+EOS+MONOS # BLD AUTO: 0.5 K/UL (ref 0–2.3)
BASO+EOS+MONOS NFR BLD AUTO: 8 % (ref 0.1–17)
DIFFERENTIAL METHOD BLD: NORMAL
ERYTHROCYTE [DISTWIDTH] IN BLOOD BY AUTOMATED COUNT: 12.4 % (ref 11.5–14.5)
HCT VFR BLD AUTO: 42.5 % (ref 36–48)
HGB BLD-MCNC: 14.8 G/DL (ref 12–16)
LYMPHOCYTES # BLD: 2.5 K/UL (ref 1.1–5.9)
LYMPHOCYTES NFR BLD: 36 % (ref 14–44)
MCH RBC QN AUTO: 30.2 PG (ref 25–35)
MCHC RBC AUTO-ENTMCNC: 34.8 G/DL (ref 31–37)
MCV RBC AUTO: 86.7 FL (ref 78–102)
NEUTS SEG # BLD: 4 K/UL (ref 1.8–9.5)
NEUTS SEG NFR BLD: 56 % (ref 40–70)
PLATELET # BLD AUTO: 161 K/UL (ref 140–440)
RBC # BLD AUTO: 4.9 M/UL (ref 4.1–5.1)
WBC # BLD AUTO: 7 K/UL (ref 4.5–13)

## 2022-12-20 PROCEDURE — 85025 COMPLETE CBC W/AUTO DIFF WBC: CPT

## 2022-12-20 PROCEDURE — 36415 COLL VENOUS BLD VENIPUNCTURE: CPT

## 2022-12-20 NOTE — PROGRESS NOTES
[facility] Lab Visit:    Ruby Owusu  1980  [id number]    0900 Pt arrived ambulatory w/o assist. Labs drawn per order via Left AC venipuncture x1 attempt. Pt tolerated well without complaints. Gauze/ coban to site. Pt departed [facility] ambulatory and in no distress at 0910.      Visit Vitals  /80 (BP 1 Location: Left upper arm, BP Patient Position: Sitting)   Pulse 68   Temp 97.8 °F (36.6 °C)   Resp 18   SpO2 100%

## 2023-01-03 ENCOUNTER — APPOINTMENT (OUTPATIENT)
Dept: INFUSION THERAPY | Age: 43
End: 2023-01-03
Payer: OTHER GOVERNMENT

## 2023-01-03 ENCOUNTER — HOSPITAL ENCOUNTER (OUTPATIENT)
Dept: INFUSION THERAPY | Age: 43
Discharge: HOME | End: 2023-01-03
Payer: OTHER GOVERNMENT

## 2023-01-03 VITALS
RESPIRATION RATE: 18 BRPM | OXYGEN SATURATION: 100 % | TEMPERATURE: 97.2 F | SYSTOLIC BLOOD PRESSURE: 121 MMHG | DIASTOLIC BLOOD PRESSURE: 78 MMHG | HEART RATE: 88 BPM

## 2023-01-03 LAB
BASO+EOS+MONOS # BLD AUTO: 0.5 K/UL (ref 0–2.3)
BASO+EOS+MONOS NFR BLD AUTO: 7 % (ref 0.1–17)
DIFFERENTIAL METHOD BLD: NORMAL
ERYTHROCYTE [DISTWIDTH] IN BLOOD BY AUTOMATED COUNT: 11.9 % (ref 11.5–14.5)
HCT VFR BLD AUTO: 43.8 % (ref 36–48)
HGB BLD-MCNC: 15 G/DL (ref 12–16)
LYMPHOCYTES # BLD: 2.2 K/UL (ref 1.1–5.9)
LYMPHOCYTES NFR BLD: 27 % (ref 14–44)
MCH RBC QN AUTO: 29.4 PG (ref 25–35)
MCHC RBC AUTO-ENTMCNC: 34.2 G/DL (ref 31–37)
MCV RBC AUTO: 85.9 FL (ref 78–102)
NEUTS SEG # BLD: 5.6 K/UL (ref 1.8–9.5)
NEUTS SEG NFR BLD: 66 % (ref 40–70)
PLATELET # BLD AUTO: 175 K/UL (ref 140–440)
RBC # BLD AUTO: 5.1 M/UL (ref 4.1–5.1)
WBC # BLD AUTO: 8.3 K/UL (ref 4.5–13)

## 2023-01-03 PROCEDURE — 85025 COMPLETE CBC W/AUTO DIFF WBC: CPT

## 2023-01-03 PROCEDURE — 36415 COLL VENOUS BLD VENIPUNCTURE: CPT

## 2023-01-03 NOTE — PROGRESS NOTES
[address] \[facility]\"" Progress Note                   Date: January 3, 2023    Name: Francois Palacios    MRN: [id number]    : 1980    Therapeutic Phlebotomy Q2 weeks - HELD TODAY    Mr. Mena Hernandez to [facility] ambulatory at 1505 accompanied by self. Pt was accessed and education was provided. Pt denies any complaints or concerns. Visit Vitals  /78 (BP 1 Location: Left upper arm, BP Patient Position: Sitting)   Pulse 88   Temp 97.2 °F (36.2 °C)   Resp 18   SpO2 100%       CBC drawn from left forearm venipuncture x1 attempt and CBC ran in house:     Recent Results (from the past 24 hour(s))   CBC WITH 3 PART DIFF    Collection Time: 23  3:15 PM   Result Value Ref Range    WBC 8.3 4.5 - 13.0 K/uL    RBC 5.10 4. 10 - 5.10 M/uL    HGB 15.0 12.0 - 16 g/dL    HCT 43.8 36 - 48 %    MCV 85.9 78 - 102 FL    MCH 29.4 25.0 - 35.0 PG    MCHC 34.2 31 - 37 g/dL    RDW 11.9 11.5 - 14.5 %    PLATELET 770 409 - 681 K/uL    NEUTROPHILS 66 40 - 70 %    Mixed cells 7 0.1 - 17 %    LYMPHOCYTES 27 14 - 44 %    ABS. NEUTROPHILS 5.6 1.8 - 9.5 K/UL    ABS. MIXED CELLS 0.5 0.0 - 2.3 K/uL    ABS. LYMPHOCYTES 2.2 1.1 - 5.9 K/UL    DF AUTOMATED       Phlebotomy HELD TODAY- HCT < 45. HCT 43.8      Patient armband removed and shredded. I have reviewed discharge instructions with the patient. The patient verbalized understanding. Mr. Mena Hernandez was discharged from [place] [address] in stable condition at 1525. He is to return on 23 at 1500 for his next phlebotomy appointment.     Yesika Javier RN  January 3, 2023

## 2023-01-17 ENCOUNTER — HOSPITAL ENCOUNTER (OUTPATIENT)
Dept: INFUSION THERAPY | Age: 43
Discharge: HOME | End: 2023-01-17
Payer: OTHER GOVERNMENT

## 2023-01-17 ENCOUNTER — APPOINTMENT (OUTPATIENT)
Dept: INFUSION THERAPY | Age: 43
End: 2023-01-17
Payer: OTHER GOVERNMENT

## 2023-01-17 VITALS
DIASTOLIC BLOOD PRESSURE: 80 MMHG | HEART RATE: 65 BPM | OXYGEN SATURATION: 100 % | SYSTOLIC BLOOD PRESSURE: 133 MMHG | RESPIRATION RATE: 18 BRPM | TEMPERATURE: 98 F

## 2023-01-17 LAB
BASO+EOS+MONOS # BLD AUTO: 0.5 K/UL (ref 0–2.3)
BASO+EOS+MONOS NFR BLD AUTO: 7 % (ref 0.1–17)
DIFFERENTIAL METHOD BLD: ABNORMAL
ERYTHROCYTE [DISTWIDTH] IN BLOOD BY AUTOMATED COUNT: 12.7 % (ref 11.5–14.5)
HCT VFR BLD AUTO: 44 % (ref 36–48)
HGB BLD-MCNC: 14.7 G/DL (ref 12–16)
LYMPHOCYTES # BLD: 2.7 K/UL (ref 1.1–5.9)
LYMPHOCYTES NFR BLD: 41 % (ref 14–44)
MCH RBC QN AUTO: 28.7 PG (ref 25–35)
MCHC RBC AUTO-ENTMCNC: 33.4 G/DL (ref 31–37)
MCV RBC AUTO: 85.8 FL (ref 78–102)
NEUTS SEG # BLD: 3.5 K/UL (ref 1.8–9.5)
NEUTS SEG NFR BLD: 52 % (ref 40–70)
PLATELET # BLD AUTO: 159 K/UL (ref 140–440)
RBC # BLD AUTO: 5.13 M/UL (ref 4.1–5.1)
WBC # BLD AUTO: 6.7 K/UL (ref 4.5–13)

## 2023-01-17 PROCEDURE — 36415 COLL VENOUS BLD VENIPUNCTURE: CPT

## 2023-01-17 PROCEDURE — 85025 COMPLETE CBC W/AUTO DIFF WBC: CPT

## 2023-01-31 ENCOUNTER — HOSPITAL ENCOUNTER (OUTPATIENT)
Dept: INFUSION THERAPY | Age: 43
Discharge: HOME | End: 2023-01-31
Payer: OTHER GOVERNMENT

## 2023-01-31 VITALS
HEART RATE: 74 BPM | TEMPERATURE: 98.4 F | DIASTOLIC BLOOD PRESSURE: 83 MMHG | RESPIRATION RATE: 18 BRPM | SYSTOLIC BLOOD PRESSURE: 120 MMHG | OXYGEN SATURATION: 95 %

## 2023-01-31 LAB
BASO+EOS+MONOS # BLD AUTO: 0.4 K/UL (ref 0–2.3)
BASO+EOS+MONOS NFR BLD AUTO: 6 % (ref 0.1–17)
DIFFERENTIAL METHOD BLD: ABNORMAL
ERYTHROCYTE [DISTWIDTH] IN BLOOD BY AUTOMATED COUNT: 12.1 % (ref 11.5–14.5)
HCT VFR BLD AUTO: 45.7 % (ref 36–48)
HGB BLD-MCNC: 15.5 G/DL (ref 12–16)
LYMPHOCYTES # BLD: 2.6 K/UL (ref 1.1–5.9)
LYMPHOCYTES NFR BLD: 39 % (ref 14–44)
MCH RBC QN AUTO: 28.7 PG (ref 25–35)
MCHC RBC AUTO-ENTMCNC: 33.9 G/DL (ref 31–37)
MCV RBC AUTO: 84.5 FL (ref 78–102)
NEUTS SEG # BLD: 3.6 K/UL (ref 1.8–9.5)
NEUTS SEG NFR BLD: 55 % (ref 40–70)
PLATELET # BLD AUTO: 179 K/UL (ref 140–440)
RBC # BLD AUTO: 5.41 M/UL (ref 4.1–5.1)
WBC # BLD AUTO: 6.6 K/UL (ref 4.5–13)

## 2023-01-31 PROCEDURE — 99195 PHLEBOTOMY: CPT

## 2023-01-31 PROCEDURE — 36415 COLL VENOUS BLD VENIPUNCTURE: CPT

## 2023-01-31 PROCEDURE — 85025 COMPLETE CBC W/AUTO DIFF WBC: CPT

## 2023-01-31 NOTE — PROGRESS NOTES
[facility] Progress Note                   Date: 2023    Name: Elena Young    MRN: [id number]    : 1980    Therapeutic Phlebotomy Q2 weeks (Weekly Status)    Mr. Jasper Gagnon to [facility] ambulatory at  accompanied by self. Pt was accessed and education was provided. Pt denies any complaints or concerns. Visit Vitals  /83 (BP 1 Location: Right upper arm, BP Patient Position: Sitting)   Pulse 74   Temp 98.4 °F (36.9 °C)   Resp 18   SpO2 95%       CBC drawn from [facility] by Monique Phlebotomist and CBC ran in house:     Recent Results (from the past 24 hour(s))   CBC WITH 3 PART DIFF    Collection Time: 23  3:06 PM   Result Value Ref Range    WBC 6.6 4.5 - 13.0 K/uL    RBC 5.41 (H) 4.10 - 5.10 M/uL    HGB 15.5 12.0 - 16 g/dL    HCT 45.7 36 - 48 %    MCV 84.5 78 - 102 FL    MCH 28.7 25.0 - 35.0 PG    MCHC 33.9 31 - 37 g/dL    RDW 12.1 11.5 - 14.5 %    PLATELET 898 894 - 352 K/uL    NEUTROPHILS 55 40 - 70 %    Mixed cells 6 0.1 - 17 %    LYMPHOCYTES 39 14 - 44 %    ABS. NEUTROPHILS 3.6 1.8 - 9.5 K/UL    ABS. MIXED CELLS 0.4 0.0 - 2.3 K/uL    ABS. LYMPHOCYTES 2.6 1.1 - 5.9 K/UL    DF AUTOMATED       Phlebotomy indicated for HCT >45 per order. Hct 45.7    20 G PIV started to [facility] x1 attempt. Phlebotomy started at 1522. Phlebotomy ended at 1536. Total of approximately 500cc of blood withdrawn per order. Patient offered snacks and drink but politely refused during procedure. Patient also politely refused IV hydration of 500 ml NS post procedure today. Patient encouraged to hydrate well. Patient was drinking his own water during procedure. Pt tolerated well with no complaints or concerns. VSS. PIV discontinued and gauze and coban applied to site. Patient armband removed and shredded.       Patient Vitals for the past 12 hrs:   Temp Pulse Resp BP SpO2   23 1539 98.4 °F (36.9 °C) 74 18 120/83 95 %   23 1510 98 °F (36.7 °C) 76 18 (!) 150/72 99 %     I have reviewed discharge instructions with the patient. The patient verbalized understanding. Mr. Camilo Adorno was discharged from [place] [address] in stable condition at 1540. He is to return on 02/09/23 at 1500 for his next Weekly Phlebotomy appointment.       Judy Mohamud  January 31, 2023

## 2023-02-03 DIAGNOSIS — R79.89 ELEVATED FERRITIN: ICD-10-CM

## 2023-02-03 DIAGNOSIS — D69.6 THROMBOCYTOPENIA (HCC): ICD-10-CM

## 2023-02-03 DIAGNOSIS — D75.1 POLYCYTHEMIA: Primary | ICD-10-CM

## 2023-02-09 ENCOUNTER — APPOINTMENT (OUTPATIENT)
Dept: INFUSION THERAPY | Age: 43
End: 2023-02-09
Payer: OTHER GOVERNMENT

## 2023-02-10 ENCOUNTER — HOSPITAL ENCOUNTER (OUTPATIENT)
Dept: INFUSION THERAPY | Age: 43
End: 2023-02-10
Payer: OTHER GOVERNMENT

## 2023-02-10 VITALS
TEMPERATURE: 97 F | DIASTOLIC BLOOD PRESSURE: 70 MMHG | OXYGEN SATURATION: 98 % | HEART RATE: 66 BPM | SYSTOLIC BLOOD PRESSURE: 119 MMHG | RESPIRATION RATE: 18 BRPM

## 2023-02-10 LAB
BASO+EOS+MONOS # BLD AUTO: 0.4 K/UL (ref 0–2.3)
BASO+EOS+MONOS NFR BLD AUTO: 6 % (ref 0.1–17)
DIFFERENTIAL METHOD BLD: NORMAL
ERYTHROCYTE [DISTWIDTH] IN BLOOD BY AUTOMATED COUNT: 12.6 % (ref 11.5–14.5)
HCT VFR BLD AUTO: 42.7 % (ref 36–48)
HGB BLD-MCNC: 14.5 G/DL (ref 12–16)
LYMPHOCYTES # BLD: 2.7 K/UL (ref 1.1–5.9)
LYMPHOCYTES NFR BLD: 38 % (ref 14–44)
MCH RBC QN AUTO: 28.6 PG (ref 25–35)
MCHC RBC AUTO-ENTMCNC: 34 G/DL (ref 31–37)
MCV RBC AUTO: 84.2 FL (ref 78–102)
NEUTS SEG # BLD: 3.9 K/UL (ref 1.8–9.5)
NEUTS SEG NFR BLD: 56 % (ref 40–70)
PLATELET # BLD AUTO: 162 K/UL (ref 140–440)
RBC # BLD AUTO: 5.07 M/UL (ref 4.1–5.1)
WBC # BLD AUTO: 7 K/UL (ref 4.5–13)

## 2023-02-10 PROCEDURE — 85025 COMPLETE CBC W/AUTO DIFF WBC: CPT

## 2023-02-10 PROCEDURE — 36415 COLL VENOUS BLD VENIPUNCTURE: CPT

## 2023-02-10 NOTE — PROGRESS NOTES
[facility] Progress Note                   Date: February 10, 2023    Name: Arash Colbert    MRN: [id number]    : 1980    Therapeutic Phlebotomy Q2 weeks - HELD TODAY    Mr. Maverick Weinstein to [facility] ambulatory at 1110 accompanied by self. Patient was accessed and education was provided. Patient denies any complaints or concerns. Visit Vitals  /70 (BP 1 Location: Right upper arm, BP Patient Position: Sitting)   Pulse 66   Temp 97 °F (36.1 °C)   Resp 18   SpO2 98%       CBC drawn from right AC venipuncture x1 attempt and CBC ran in house:     Recent Results (from the past 24 hour(s))   CBC WITH 3 PART DIFF    Collection Time: 02/10/23 11:15 AM   Result Value Ref Range    WBC 7.0 4.5 - 13.0 K/uL    RBC 5.07 4. 10 - 5.10 M/uL    HGB 14.5 12.0 - 16 g/dL    HCT 42.7 36 - 48 %    MCV 84.2 78 - 102 FL    MCH 28.6 25.0 - 35.0 PG    MCHC 34.0 31 - 37 g/dL    RDW 12.6 11.5 - 14.5 %    PLATELET 414 576 - 545 K/uL    NEUTROPHILS 56 40 - 70 %    Mixed cells 6 0.1 - 17 %    LYMPHOCYTES 38 14 - 44 %    ABS. NEUTROPHILS 3.9 1.8 - 9.5 K/UL    ABS. MIXED CELLS 0.4 0.0 - 2.3 K/uL    ABS. LYMPHOCYTES 2.7 1.1 - 5.9 K/UL    DF AUTOMATED       Phlebotomy HELD TODAY- HCT < 45. HCT 42.7      Patient armband removed and shredded. I have reviewed discharge instructions with the patient. The patient verbalized understanding. Mr. Maverick Weinstein was discharged from [place] [address] in stable condition at 1120. He is to return on 23 at 1100 for his next phlebotomy appointment.     Derick Jaime RN  February 10, 2023

## 2023-02-24 ENCOUNTER — HOSPITAL ENCOUNTER (OUTPATIENT)
Facility: HOSPITAL | Age: 43
Setting detail: INFUSION SERIES
End: 2023-02-24
Payer: OTHER GOVERNMENT

## 2023-02-24 VITALS
DIASTOLIC BLOOD PRESSURE: 60 MMHG | SYSTOLIC BLOOD PRESSURE: 148 MMHG | HEART RATE: 101 BPM | RESPIRATION RATE: 18 BRPM | TEMPERATURE: 98 F | OXYGEN SATURATION: 100 %

## 2023-02-24 LAB
BASO+EOS+MONOS # BLD AUTO: 0.4 K/UL (ref 0–2.3)
BASO+EOS+MONOS NFR BLD AUTO: 6 % (ref 0.1–17)
DIFFERENTIAL METHOD BLD: ABNORMAL
ERYTHROCYTE [DISTWIDTH] IN BLOOD BY AUTOMATED COUNT: 12.6 % (ref 11.5–14.5)
HCT VFR BLD AUTO: 44.5 % (ref 36–48)
HGB BLD-MCNC: 15 G/DL (ref 12–16)
LYMPHOCYTES # BLD: 2.3 K/UL (ref 1.1–5.9)
LYMPHOCYTES NFR BLD: 37 % (ref 14–44)
MCH RBC QN AUTO: 28.1 PG (ref 25–35)
MCHC RBC AUTO-ENTMCNC: 33.7 G/DL (ref 31–37)
MCV RBC AUTO: 83.5 FL (ref 78–102)
NEUTS SEG # BLD: 3.6 K/UL (ref 1.8–9.5)
NEUTS SEG NFR BLD: 57 % (ref 40–70)
PLATELET # BLD AUTO: 131 K/UL (ref 140–440)
RBC # BLD AUTO: 5.33 M/UL (ref 4.1–5.1)
WBC # BLD AUTO: 6.3 K/UL (ref 4.5–13)

## 2023-02-24 PROCEDURE — 36415 COLL VENOUS BLD VENIPUNCTURE: CPT

## 2023-02-24 PROCEDURE — 85025 COMPLETE CBC W/AUTO DIFF WBC: CPT

## 2023-02-24 NOTE — PROGRESS NOTES
[facility] Lab Visit:  Date: 2023    Name: Kraig Myers    : 1980    MRN: [id number]           1925 Pt arrived ambulatory w/o assist. Labs drawn per order via Right venipuncture x1 attempt. Pt tolerated well without complaints. Gauze/ coban to site. Pt departed [facility] ambulatory and in no distress at 1015.     Vitals:    23 1005   BP: (!) 148/60   Pulse: (!) 101   Resp: 18   Temp: 98 °F (36.7 °C)   SpO2: 100%         Cynthia Mejia  2023

## 2023-04-03 ENCOUNTER — HOSPITAL ENCOUNTER (OUTPATIENT)
Facility: HOSPITAL | Age: 43
Setting detail: INFUSION SERIES
End: 2023-04-03
Payer: OTHER GOVERNMENT

## 2023-04-03 ENCOUNTER — OFFICE VISIT (OUTPATIENT)
Age: 43
End: 2023-04-03
Payer: OTHER GOVERNMENT

## 2023-04-03 VITALS
SYSTOLIC BLOOD PRESSURE: 126 MMHG | HEART RATE: 69 BPM | OXYGEN SATURATION: 96 % | RESPIRATION RATE: 16 BRPM | TEMPERATURE: 97.9 F | DIASTOLIC BLOOD PRESSURE: 89 MMHG

## 2023-04-03 VITALS
RESPIRATION RATE: 16 BRPM | DIASTOLIC BLOOD PRESSURE: 75 MMHG | WEIGHT: 225 LBS | BODY MASS INDEX: 31.5 KG/M2 | HEIGHT: 71 IN | HEART RATE: 78 BPM | OXYGEN SATURATION: 98 % | SYSTOLIC BLOOD PRESSURE: 114 MMHG

## 2023-04-03 DIAGNOSIS — D75.1 SECONDARY POLYCYTHEMIA: ICD-10-CM

## 2023-04-03 DIAGNOSIS — D75.1 SECONDARY POLYCYTHEMIA: Primary | ICD-10-CM

## 2023-04-03 DIAGNOSIS — D69.6 THROMBOCYTOPENIA, UNSPECIFIED (HCC): ICD-10-CM

## 2023-04-03 LAB
ALBUMIN SERPL-MCNC: 3.7 G/DL (ref 3.4–5)
ALBUMIN/GLOB SERPL: 1.2 (ref 0.8–1.7)
ALP SERPL-CCNC: 60 U/L (ref 45–117)
ALT SERPL-CCNC: 30 U/L (ref 16–61)
ANION GAP SERPL CALC-SCNC: 8 MMOL/L (ref 3–18)
AST SERPL-CCNC: 24 U/L (ref 10–38)
BASO+EOS+MONOS # BLD AUTO: 0.5 K/UL (ref 0–2.3)
BASO+EOS+MONOS NFR BLD AUTO: 6 % (ref 0.1–17)
BILIRUB SERPL-MCNC: 0.6 MG/DL (ref 0.2–1)
BUN SERPL-MCNC: 15 MG/DL (ref 7–18)
BUN/CREAT SERPL: 16 (ref 12–20)
CALCIUM SERPL-MCNC: 9.1 MG/DL (ref 8.5–10.1)
CHLORIDE SERPL-SCNC: 109 MMOL/L (ref 100–111)
CO2 SERPL-SCNC: 24 MMOL/L (ref 21–32)
CREAT SERPL-MCNC: 0.94 MG/DL (ref 0.6–1.3)
DIFFERENTIAL METHOD BLD: ABNORMAL
ERYTHROCYTE [DISTWIDTH] IN BLOOD BY AUTOMATED COUNT: 13.2 % (ref 11.5–14.5)
FERRITIN SERPL-MCNC: 29 NG/ML (ref 8–388)
GLOBULIN SER CALC-MCNC: 3.2 G/DL (ref 2–4)
GLUCOSE SERPL-MCNC: 86 MG/DL (ref 74–99)
HCT VFR BLD AUTO: 46.6 % (ref 36–48)
HGB BLD-MCNC: 16.1 G/DL (ref 12–16)
IRON SATN MFR SERPL: 40 % (ref 20–50)
IRON SERPL-MCNC: 138 UG/DL (ref 50–175)
LYMPHOCYTES # BLD: 3 K/UL (ref 1.1–5.9)
LYMPHOCYTES NFR BLD: 38 % (ref 14–44)
MCH RBC QN AUTO: 28.2 PG (ref 25–35)
MCHC RBC AUTO-ENTMCNC: 34.5 G/DL (ref 31–37)
MCV RBC AUTO: 81.8 FL (ref 78–102)
NEUTS SEG # BLD: 4.5 K/UL (ref 1.8–9.5)
NEUTS SEG NFR BLD: 56 % (ref 40–70)
PLATELET # BLD AUTO: 158 K/UL (ref 140–440)
POTASSIUM SERPL-SCNC: 4.3 MMOL/L (ref 3.5–5.5)
PROT SERPL-MCNC: 6.9 G/DL (ref 6.4–8.2)
RBC # BLD AUTO: 5.7 M/UL (ref 4.1–5.1)
SODIUM SERPL-SCNC: 141 MMOL/L (ref 136–145)
TIBC SERPL-MCNC: 349 UG/DL (ref 250–450)
WBC # BLD AUTO: 8 K/UL (ref 4.5–13)

## 2023-04-03 PROCEDURE — 82728 ASSAY OF FERRITIN: CPT

## 2023-04-03 PROCEDURE — 99213 OFFICE O/P EST LOW 20 MIN: CPT | Performed by: NURSE PRACTITIONER

## 2023-04-03 PROCEDURE — 2580000003 HC RX 258: Performed by: INTERNAL MEDICINE

## 2023-04-03 PROCEDURE — 99195 PHLEBOTOMY: CPT

## 2023-04-03 PROCEDURE — 83550 IRON BINDING TEST: CPT

## 2023-04-03 PROCEDURE — 36415 COLL VENOUS BLD VENIPUNCTURE: CPT

## 2023-04-03 PROCEDURE — 85025 COMPLETE CBC W/AUTO DIFF WBC: CPT

## 2023-04-03 PROCEDURE — 80053 COMPREHEN METABOLIC PANEL: CPT

## 2023-04-03 PROCEDURE — 99212 OFFICE O/P EST SF 10 MIN: CPT | Performed by: NURSE PRACTITIONER

## 2023-04-03 RX ORDER — 0.9 % SODIUM CHLORIDE 0.9 %
500 INTRAVENOUS SOLUTION INTRAVENOUS ONCE
Status: COMPLETED | OUTPATIENT
Start: 2023-04-03 | End: 2023-04-03

## 2023-04-03 RX ADMIN — SODIUM CHLORIDE 500 ML: 9 INJECTION, SOLUTION INTRAVENOUS at 10:50

## 2023-04-03 ASSESSMENT — ENCOUNTER SYMPTOMS
COLOR CHANGE: 0
ABDOMINAL PAIN: 0
CONSTIPATION: 0
BLOOD IN STOOL: 0
SHORTNESS OF BREATH: 0
VOMITING: 0
CHOKING: 0
DIARRHEA: 0
CHEST TIGHTNESS: 0
NAUSEA: 0

## 2023-04-03 ASSESSMENT — PAIN DESCRIPTION - DESCRIPTORS: DESCRIPTORS: ACHING

## 2023-04-03 ASSESSMENT — PAIN - FUNCTIONAL ASSESSMENT: PAIN_FUNCTIONAL_ASSESSMENT: 0-10

## 2023-04-03 NOTE — PROGRESS NOTES
vomiting, abdominal pain, altered bowel habits, dysuria, bone pain or back pain, focal numbness or weakness. He does not have any concerns or complaints to report at this time. Past Medical History, Surgical History, Family History, and Social History reviewed and remain unchanged. Past Medical History:   Diagnosis Date    Thrombocytopenia [facility])      Past Surgical History:   Procedure Laterality Date    IR BIOPSY PERC SUPERF BONE  7/27/2022    IR BIOPSY PERC SUPERF BONE 7/27/2022 [address] [facility] IR    IR BIOPSY PERC SUPERF BONE  7/27/2022    ORTHOPEDIC SURGERY      left shoulder 2004/2005     Social History     Socioeconomic History    Marital status:      Spouse name: None    Number of children: None    Years of education: None    Highest education level: None   Tobacco Use    Smoking status: Never    Smokeless tobacco: Never   Substance and Sexual Activity    Alcohol use: Yes    Drug use: Never      History reviewed. No pertinent family history. Social Determinants of Health     Tobacco Use: Low Risk     Smoking Tobacco Use: Never    Smokeless Tobacco Use: Never    Passive Exposure: Not on file   Alcohol Use: Not on file   Financial Resource Strain: Not on file   Food Insecurity: Not on file   Transportation Needs: Not on file   Physical Activity: Not on file   Stress: Not on file   Social Connections: Not on file   Intimate Partner Violence: Not on file   Depression: Not on file   Housing Stability: Not on file        Current Outpatient Medications   Medication Sig Dispense Refill    Cholecalciferol 50 MCG (2000 UT) CAPS Take by mouth daily      Cyanocobalamin 1000 MCG CAPS cyanocobalamin (vitamin B-12) 1,000 mcg capsule   Take 1 capsule every day by oral route.       HYDROcodone-acetaminophen (NORCO) 7.5-325 MG per tablet hydrocodone 7.5 mg-acetaminophen 325 mg tablet   TK ONE T PO Q 4 HOURS PRN P      ibuprofen (ADVIL;MOTRIN) 800 MG tablet ceived the following from [facility]:

## 2023-04-03 NOTE — PROGRESS NOTES
[facility] Progress Note    Date: April 3, 2023    Name: Agata Whyte    MRN: [id number]         : 1980    Mr. Latoya Tejeda arrived in the [facility] today at [phone], in stable condition and just after his office visit with Nurse Practitioner Vanda Siddiqi, here for CBC//Phlebotomy. He was assessed and education was provided. Mr. Cindy Juan vitals were reviewed. Vitals:    23 0950   BP: 123/74   Pulse: 68   Resp: 16   Temp: 97.9 °F (36.6 °C)   SpO2: 96%           Blood for the ordered labs (CBC--[facility]) & (FERRITIN, CMP, IRON, TIBC-- OV LABS) was drawn from his RIGHT AC at 1012 without incident. The CBC was processed on site, and the remaining labs were sent out by  to the [facility] lab for processing. The CBC results from today were as follows: Monica Rios Recent Results (from the past 12 hour(s))   CBC with Partial Differential    Collection Time: 23 10:12 AM   Result Value Ref Range    WBC 8.0 4.5 - 13.0 K/uL    RBC 5.70 (H) 4.10 - 5.10 M/uL    Hemoglobin 16.1 (HH) 12.0 - 16 g/dL    Hematocrit 46.6 36 - 48 %    MCV 81.8 78 - 102 FL    MCH 28.2 25.0 - 35.0 PG    MCHC 34.5 31 - 37 g/dL    RDW 13.2 11.5 - 14.5 %    Platelets 570 126 - 059 K/uL    Seg Neutrophils 56 40 - 70 %    Mixed Cells 6 0.1 - 17 %    Lymphocytes 38 14 - 44 %    Segs Absolute 4.5 1.8 - 9.5 K/UL    ABSOLUTE MIXED CELLS 0.5 0.0 - 2.3 K/uL    Absolute Lymph # 3.0 1.1 - 5.9 K/UL    Differential Type AUTOMATED           The Critically elevated HGB of 16.1 was reported to Nurse Practitioner Zelpha Litten, and was documented under critical results in his electronic record. No new orders were received. Phlebotomy was indicated today per order, for HCT > 45.0. (Every 2 Week Status Parameters). PIV # 21 G was established in his RIGHT AC at 1030 without incident, and brisk blood return was obtained, and phlebotomy was started. Snack was offered, but was politely declined.       Phlebotomy was completed without incident at 1050

## 2023-04-11 ENCOUNTER — HOSPITAL ENCOUNTER (OUTPATIENT)
Facility: HOSPITAL | Age: 43
Setting detail: INFUSION SERIES
Discharge: HOME | End: 2023-04-14
Payer: OTHER GOVERNMENT

## 2023-04-11 VITALS
HEART RATE: 76 BPM | TEMPERATURE: 98.2 F | RESPIRATION RATE: 16 BRPM | OXYGEN SATURATION: 96 % | SYSTOLIC BLOOD PRESSURE: 111 MMHG | DIASTOLIC BLOOD PRESSURE: 78 MMHG

## 2023-04-11 LAB
BASO+EOS+MONOS # BLD AUTO: 0.4 K/UL (ref 0–2.3)
BASO+EOS+MONOS NFR BLD AUTO: 5 % (ref 0.1–17)
DIFFERENTIAL METHOD BLD: ABNORMAL
ERYTHROCYTE [DISTWIDTH] IN BLOOD BY AUTOMATED COUNT: 13.6 % (ref 11.5–14.5)
HCT VFR BLD AUTO: 43.3 % (ref 36–48)
HGB BLD-MCNC: 14.8 G/DL (ref 12–16)
LYMPHOCYTES # BLD: 2.3 K/UL (ref 1.1–5.9)
LYMPHOCYTES NFR BLD: 27 % (ref 14–44)
MCH RBC QN AUTO: 28.1 PG (ref 25–35)
MCHC RBC AUTO-ENTMCNC: 34.2 G/DL (ref 31–37)
MCV RBC AUTO: 82.2 FL (ref 78–102)
NEUTS SEG # BLD: 5.6 K/UL (ref 1.8–9.5)
NEUTS SEG NFR BLD: 68 % (ref 40–70)
PLATELET # BLD AUTO: 168 K/UL (ref 140–440)
RBC # BLD AUTO: 5.27 M/UL (ref 4.1–5.1)
WBC # BLD AUTO: 8.3 K/UL (ref 4.5–13)

## 2023-04-11 PROCEDURE — 85025 COMPLETE CBC W/AUTO DIFF WBC: CPT

## 2023-04-11 PROCEDURE — 36415 COLL VENOUS BLD VENIPUNCTURE: CPT

## 2023-04-11 ASSESSMENT — PAIN - FUNCTIONAL ASSESSMENT: PAIN_FUNCTIONAL_ASSESSMENT: 0-10

## 2023-04-11 ASSESSMENT — PAIN DESCRIPTION - DESCRIPTORS: DESCRIPTORS: ACHING

## 2023-04-11 NOTE — PROGRESS NOTES
[facility] Progress Note    Date: 2023    Name: Sheila Barr    MRN: [id number]         : 1980    Mr. Sandy Brown arrived in the [facility] today at 56, in stable condition, here for CBC//Phlebotomy (WEEKLY STATUS). He was assessed and education was provided. Mr. Brad Matthew vitals were reviewed. Vitals:    23 1000   BP: 111/78   Pulse: 76   Resp: 16   Temp: 98.2 °F (36.8 °C)   SpO2: 96%           Blood for the ordered CBC was drawn from his LEFT AC at 1018 without incident and was processed on site. The CBC results from today were as follows: Staci Russell Results for orders placed or performed during the hospital encounter of 23   CBC with Partial Differential   Result Value Ref Range    WBC 8.3 4.5 - 13.0 K/uL    RBC 5.27 (H) 4.10 - 5.10 M/uL    Hemoglobin 14.8 12.0 - 16 g/dL    Hematocrit 43.3 36 - 48 %    MCV 82.2 78 - 102 FL    MCH 28.1 25.0 - 35.0 PG    MCHC 34.2 31 - 37 g/dL    RDW 13.6 11.5 - 14.5 %    Platelets 562 758 - 666 K/uL    Seg Neutrophils 68 40 - 70 %    Mixed Cells 5 0.1 - 17 %    Lymphocytes 27 14 - 44 %    Segs Absolute 5.6 1.8 - 9.5 K/UL    ABSOLUTE MIXED CELLS 0.4 0.0 - 2.3 K/uL    Absolute Lymph # 2.3 1.1 - 5.9 K/UL    Differential Type AUTOMATED            Phlebotomy was HELD today per order, for HCT < 45.0. Mr. Sandy Brown tolerated well, and voiced no complaints. Mr. Sandy Brown was discharged from [place] [address] in stable condition at 1030. He is to return in 2 WEEKS, on Tuesday, 23 at 1000, for his next appointment, for CBC//Phlebotomy (EVERY 2 WEEKS STATUS).      April Whitmore RN  2023  10:15 AM

## 2023-04-25 ENCOUNTER — HOSPITAL ENCOUNTER (OUTPATIENT)
Facility: HOSPITAL | Age: 43
Setting detail: INFUSION SERIES
End: 2023-04-25
Payer: OTHER GOVERNMENT

## 2023-04-25 VITALS
SYSTOLIC BLOOD PRESSURE: 124 MMHG | OXYGEN SATURATION: 99 % | TEMPERATURE: 97.8 F | RESPIRATION RATE: 16 BRPM | HEART RATE: 63 BPM | DIASTOLIC BLOOD PRESSURE: 62 MMHG

## 2023-04-25 LAB
BASO+EOS+MONOS # BLD AUTO: 0.5 K/UL (ref 0–2.3)
BASO+EOS+MONOS NFR BLD AUTO: 7 % (ref 0.1–17)
DIFFERENTIAL METHOD BLD: ABNORMAL
ERYTHROCYTE [DISTWIDTH] IN BLOOD BY AUTOMATED COUNT: 13.7 % (ref 11.5–14.5)
HCT VFR BLD AUTO: 44.9 % (ref 36–48)
HGB BLD-MCNC: 15.4 G/DL (ref 12–16)
LYMPHOCYTES # BLD: 2.7 K/UL (ref 1.1–5.9)
LYMPHOCYTES NFR BLD: 35 % (ref 14–44)
MCH RBC QN AUTO: 28.5 PG (ref 25–35)
MCHC RBC AUTO-ENTMCNC: 34.3 G/DL (ref 31–37)
MCV RBC AUTO: 83 FL (ref 78–102)
NEUTS SEG # BLD: 4.5 K/UL (ref 1.8–9.5)
NEUTS SEG NFR BLD: 58 % (ref 40–70)
PLATELET # BLD AUTO: 140 K/UL (ref 140–440)
RBC # BLD AUTO: 5.41 M/UL (ref 4.1–5.1)
WBC # BLD AUTO: 7.7 K/UL (ref 4.5–13)

## 2023-04-25 PROCEDURE — 36415 COLL VENOUS BLD VENIPUNCTURE: CPT

## 2023-04-25 PROCEDURE — 85025 COMPLETE CBC W/AUTO DIFF WBC: CPT

## 2023-04-25 ASSESSMENT — PAIN DESCRIPTION - PAIN TYPE: TYPE: CHRONIC PAIN

## 2023-04-25 ASSESSMENT — PAIN DESCRIPTION - LOCATION: LOCATION: GENERALIZED

## 2023-04-25 ASSESSMENT — PAIN DESCRIPTION - DESCRIPTORS: DESCRIPTORS: ACHING

## 2023-04-25 ASSESSMENT — PAIN SCALES - GENERAL: PAINLEVEL_OUTOF10: 3

## 2023-05-09 ENCOUNTER — HOSPITAL ENCOUNTER (OUTPATIENT)
Facility: HOSPITAL | Age: 43
Setting detail: INFUSION SERIES
End: 2023-05-09
Payer: OTHER GOVERNMENT

## 2023-05-09 VITALS
DIASTOLIC BLOOD PRESSURE: 81 MMHG | RESPIRATION RATE: 16 BRPM | SYSTOLIC BLOOD PRESSURE: 133 MMHG | OXYGEN SATURATION: 99 % | TEMPERATURE: 98.1 F | HEART RATE: 75 BPM

## 2023-05-09 LAB
BASO+EOS+MONOS # BLD AUTO: 0.4 K/UL (ref 0–2.3)
BASO+EOS+MONOS NFR BLD AUTO: 7 % (ref 0.1–17)
DIFFERENTIAL METHOD BLD: ABNORMAL
ERYTHROCYTE [DISTWIDTH] IN BLOOD BY AUTOMATED COUNT: 13.3 % (ref 11.5–14.5)
HCT VFR BLD AUTO: 45.3 % (ref 36–48)
HGB BLD-MCNC: 15.1 G/DL (ref 12–16)
LYMPHOCYTES # BLD: 2.1 K/UL (ref 1.1–5.9)
LYMPHOCYTES NFR BLD: 32 % (ref 14–44)
MCH RBC QN AUTO: 27.7 PG (ref 25–35)
MCHC RBC AUTO-ENTMCNC: 33.3 G/DL (ref 31–37)
MCV RBC AUTO: 83.1 FL (ref 78–102)
NEUTS SEG # BLD: 4 K/UL (ref 1.8–9.5)
NEUTS SEG NFR BLD: 62 % (ref 40–70)
PLATELET # BLD AUTO: 140 K/UL (ref 140–440)
RBC # BLD AUTO: 5.45 M/UL (ref 4.1–5.1)
WBC # BLD AUTO: 6.5 K/UL (ref 4.5–13)

## 2023-05-09 PROCEDURE — 85025 COMPLETE CBC W/AUTO DIFF WBC: CPT

## 2023-05-09 PROCEDURE — 99195 PHLEBOTOMY: CPT

## 2023-05-09 PROCEDURE — 2580000003 HC RX 258: Performed by: INTERNAL MEDICINE

## 2023-05-09 RX ORDER — SODIUM CHLORIDE 0.9 % (FLUSH) 0.9 %
5-40 SYRINGE (ML) INJECTION PRN
Status: DISCONTINUED | OUTPATIENT
Start: 2023-05-09 | End: 2023-10-28 | Stop reason: HOSPADM

## 2023-05-09 RX ORDER — SODIUM CHLORIDE 9 MG/ML
INJECTION, SOLUTION INTRAVENOUS ONCE
Status: COMPLETED | OUTPATIENT
Start: 2023-05-09 | End: 2023-05-09

## 2023-05-09 RX ADMIN — SODIUM CHLORIDE: 9 INJECTION, SOLUTION INTRAVENOUS at 11:37

## 2023-05-09 RX ADMIN — SODIUM CHLORIDE, PRESERVATIVE FREE 10 ML: 5 INJECTION INTRAVENOUS at 11:15

## 2023-05-09 ASSESSMENT — PAIN SCALES - GENERAL: PAINLEVEL_OUTOF10: 3

## 2023-05-09 ASSESSMENT — PAIN DESCRIPTION - DESCRIPTORS: DESCRIPTORS: ACHING

## 2023-05-09 ASSESSMENT — PAIN DESCRIPTION - PAIN TYPE: TYPE: CHRONIC PAIN

## 2023-05-09 ASSESSMENT — PAIN DESCRIPTION - LOCATION: LOCATION: GENERALIZED

## 2023-05-09 NOTE — PROGRESS NOTES
[facility] Progress Note    Date: May 9, 2023    Name: Radha Gonzalez    MRN: [id number]         : 1980    Therapeutic Phlebotomy- Every 2 Weeks     Mr. Bill Chen arrived to [facility] at , ambulatory. Mr. Bill Chen was assessed and education was provided. Mr. Indigo Wallace vitals were reviewed. Vitals:    23 1108   BP: 134/78   Pulse: 75   Resp: 16   Temp: 98.2 °F (36.8 °C)   SpO2: 97%       18g PIV established in patients RIGHT AC x 1 attempt. CBC collected via PIV. CBC processed on site. Lab results were obtained and reviewed. Recent Results (from the past 12 hour(s))   CBC with Partial Differential    Collection Time: 23 11:14 AM   Result Value Ref Range    WBC 6.5 4.5 - 13.0 K/uL    RBC 5.45 (H) 4.10 - 5.10 M/uL    Hemoglobin 15.1 12.0 - 16 g/dL    Hematocrit 45.3 36 - 48 %    MCV 83.1 78 - 102 FL    MCH 27.7 25.0 - 35.0 PG    MCHC 33.3 31 - 37 g/dL    RDW 13.3 11.5 - 14.5 %    Platelets 714 621 - 091 K/uL    Seg Neutrophils 62 40 - 70 %    Mixed Cells 7 0.1 - 17 %    Lymphocytes 32 14 - 44 %    Segs Absolute 4.0 1.8 - 9.5 K/UL    ABSOLUTE MIXED CELLS 0.4 0.0 - 2.3 K/uL    Absolute Lymph # 2.1 1.1 - 5.9 K/UL    Differential Type AUTOMATED         Hgb 15.1 and Hct 45.3. Therapeutic phlebotomy initiated at 1120. Therapeutic phlebotomy ended at 66 65 76 with approximately 500ml of blood obtained followed by 500ml of NS administered as post-phlebotomy hydration per orders. Pt offered snack/ drink throughout his visit. Mr. Bill Chen tolerated well without complaints. PIV removed, cath tip intact. Gauze and coban applied. Mr. Bill Chen was discharged from [place] [address] in stable condition at 1200. He is to return on 23 at 1100 for his next appointment for his next phlebotomy.     Herman Nichols RN  May 9, 2023

## 2023-05-17 ENCOUNTER — HOSPITAL ENCOUNTER (OUTPATIENT)
Facility: HOSPITAL | Age: 43
Setting detail: INFUSION SERIES
End: 2023-05-17
Payer: OTHER GOVERNMENT

## 2023-05-17 VITALS
HEART RATE: 68 BPM | DIASTOLIC BLOOD PRESSURE: 72 MMHG | TEMPERATURE: 98 F | OXYGEN SATURATION: 100 % | SYSTOLIC BLOOD PRESSURE: 133 MMHG | RESPIRATION RATE: 18 BRPM

## 2023-05-17 LAB
BASO+EOS+MONOS # BLD AUTO: 0.4 K/UL (ref 0–2.3)
BASO+EOS+MONOS NFR BLD AUTO: 7 % (ref 0.1–17)
DIFFERENTIAL METHOD BLD: ABNORMAL
ERYTHROCYTE [DISTWIDTH] IN BLOOD BY AUTOMATED COUNT: 13.3 % (ref 11.5–14.5)
HCT VFR BLD AUTO: 43.8 % (ref 36–48)
HGB BLD-MCNC: 14.8 G/DL (ref 12–16)
LYMPHOCYTES # BLD: 2.2 K/UL (ref 1.1–5.9)
LYMPHOCYTES NFR BLD: 36 % (ref 14–44)
MCH RBC QN AUTO: 28.5 PG (ref 25–35)
MCHC RBC AUTO-ENTMCNC: 33.8 G/DL (ref 31–37)
MCV RBC AUTO: 84.4 FL (ref 78–102)
NEUTS SEG # BLD: 3.6 K/UL (ref 1.8–9.5)
NEUTS SEG NFR BLD: 57 % (ref 40–70)
PLATELET # BLD AUTO: 147 K/UL (ref 140–440)
RBC # BLD AUTO: 5.19 M/UL (ref 4.1–5.1)
WBC # BLD AUTO: 6.2 K/UL (ref 4.5–13)

## 2023-05-17 PROCEDURE — 85025 COMPLETE CBC W/AUTO DIFF WBC: CPT

## 2023-05-17 PROCEDURE — 36415 COLL VENOUS BLD VENIPUNCTURE: CPT

## 2023-05-17 ASSESSMENT — PAIN DESCRIPTION - PAIN TYPE: TYPE: CHRONIC PAIN

## 2023-05-17 ASSESSMENT — PAIN DESCRIPTION - LOCATION: LOCATION: GENERALIZED

## 2023-05-17 ASSESSMENT — PAIN DESCRIPTION - DESCRIPTORS: DESCRIPTORS: ACHING

## 2023-05-17 ASSESSMENT — PAIN SCALES - GENERAL: PAINLEVEL_OUTOF10: 2

## 2023-05-17 NOTE — PROGRESS NOTES
[facility] Progress Note    Date: May 17, 2023    Name: Mayte Burnett    MRN: [id number]         : 1980    Mr. Mildred Morales arrived in the [facility] today at 0, in stable condition, here for CBC//Phlebotomy (WEEKLY STATUS). He was assessed and education was provided. Mr. Kenyetta Shea vitals were reviewed. Vitals:    23 1113   BP: 133/72   Pulse: 68   Resp: 18   Temp: 98 °F (36.7 °C)   SpO2: 100%       Blood for the ordered CBC was drawn from his LEFT AC and processed in house. The CBC results from today were as follows: Gaby Basurto Results for orders placed or performed during the hospital encounter of 23   CBC with Partial Differential   Result Value Ref Range    WBC 6.2 4.5 - 13.0 K/uL    RBC 5.19 (H) 4.10 - 5.10 M/uL    Hemoglobin 14.8 12.0 - 16 g/dL    Hematocrit 43.8 36 - 48 %    MCV 84.4 78 - 102 FL    MCH 28.5 25.0 - 35.0 PG    MCHC 33.8 31 - 37 g/dL    RDW 13.3 11.5 - 14.5 %    Platelets 085 638 - 462 K/uL    Neutrophils % 57 40 - 70 %    Mixed Cells 7 0.1 - 17 %    Lymphocytes % 36 14 - 44 %    Neutrophils Absolute 3.6 1.8 - 9.5 K/UL    ABSOLUTE MIXED CELLS 0.4 0.0 - 2.3 K/uL    Lymphocytes Absolute 2.2 1.1 - 5.9 K/UL    Differential Type AUTOMATED         Hct: 43.8    Phlebotomy was HELD today per order, for HCT < 45.0. Mr. Mildred Morales tolerated well, and voiced no complaints. Pt armband removed & shredded,    Mr. Mildred Morales was discharged from [place] [address] in stable condition at 1030. He is to return in 2 WEEKS, on 2023 at 1100, for his next appointment, for CBC//Phlebotomy (EVERY 2 WEEKS STATUS).      Ruth Villela  May 17, 2023  11:29 AM

## 2025-01-21 NOTE — PROGRESS NOTES
[facility] Progress Note    Date: 2022    Name: Kimberly Rodriguez    MRN: [id number]         : 1980    Mr. Kecia Ward arrived in the [facility] today at [phone], in stable condition, here for CBC--PHLEBOTOMY (WEEKLY STATUS). He was assessed and education was provided. Mr. Karena Manzano vitals were reviewed. Visit Vitals  /75 (BP 1 Location: Left upper arm, BP Patient Position: Sitting)   Pulse 60   Temp 97.1 °F (36.2 °C)   Resp 16   SpO2 100%             Blood for the ordered CBC was drawn from his LEFT AC at 0913 per [facility], and was processed on site. CBC results from today were as follows:    Recent Results (from the past 12 hour(s))   CBC WITH 3 PART DIFF    Collection Time: 22  9:13 AM   Result Value Ref Range    WBC 7.1 4.5 - 13.0 K/uL    RBC 4.85 4.10 - 5.10 M/uL    HGB 15.5 12.0 - 16 g/dL    HCT 43.9 36 - 48 %    MCV 90.5 78 - 102 FL    MCH 32.0 25.0 - 35.0 PG    MCHC 35.3 31 - 37 g/dL    RDW 13.0 11.5 - 14.5 %    PLATELET 794 775 - 455 K/uL    NEUTROPHILS 51 40 - 70 %    Mixed cells 7 0.1 - 17 %    LYMPHOCYTES 42 14 - 44 %    ABS. NEUTROPHILS 3.6 1.8 - 9.5 K/UL    ABS. MIXED CELLS 0.5 0.0 - 2.3 K/uL    ABS. LYMPHOCYTES 3.0 1.1 - 5.9 K/UL    DF AUTOMATED             Phlebotomy was HELD today per order, for HCT < 45.0. (Weekly Status Parameters). Mr. Kecia Ward tolerated well, and voiced no complaints. Mr. Kecia Ward was discharged from [place] [address] in stable condition at 28. He is to return in 2 weeks, on Thursday, 10-6-22 at 1400, for his next appointment, for CBC/Phlebotomy (Every 2 Week Status).      Danna Muniz RN  2022  9:10 AM
Chest pain and sore throat x 2 weeks